# Patient Record
Sex: FEMALE | Race: WHITE | Employment: OTHER | URBAN - METROPOLITAN AREA
[De-identification: names, ages, dates, MRNs, and addresses within clinical notes are randomized per-mention and may not be internally consistent; named-entity substitution may affect disease eponyms.]

---

## 2017-05-09 ENCOUNTER — TELEPHONE (OUTPATIENT)
Dept: FAMILY MEDICINE CLINIC | Facility: CLINIC | Age: 62
End: 2017-05-09

## 2017-05-09 DIAGNOSIS — R53.82 CHRONIC FATIGUE: Primary | ICD-10-CM

## 2017-05-09 DIAGNOSIS — G47.429 NARCOLEPSY DUE TO UNDERLYING CONDITION WITHOUT CATAPLEXY: ICD-10-CM

## 2017-05-09 NOTE — TELEPHONE ENCOUNTER
Please sign off sleep study  Pt will check ins and call [facility]. I made her a June 9th apt.

## 2017-05-09 NOTE — TELEPHONE ENCOUNTER
Pt saw  her endocrinologist ,Dr Amada Villatoro recently. Pt  cmp,and thyroid labs are good. Dr Amada Villatoro is referring her back to you for  persistant fatigue,,waking up tired  and falling asleep doing the day. She feels she needs a sleep study . She requested a apt with

## 2017-06-09 ENCOUNTER — OFFICE VISIT (OUTPATIENT)
Dept: FAMILY MEDICINE CLINIC | Facility: CLINIC | Age: 62
End: 2017-06-09

## 2017-06-09 VITALS
WEIGHT: 170 LBS | TEMPERATURE: 99 F | RESPIRATION RATE: 16 BRPM | HEART RATE: 65 BPM | HEIGHT: 63.5 IN | BODY MASS INDEX: 29.75 KG/M2 | SYSTOLIC BLOOD PRESSURE: 128 MMHG | DIASTOLIC BLOOD PRESSURE: 76 MMHG

## 2017-06-09 DIAGNOSIS — Z71.89 COUNSELING AND COORDINATION OF CARE: ICD-10-CM

## 2017-06-09 DIAGNOSIS — G47.10 HYPERSOMNOLENCE: Primary | ICD-10-CM

## 2017-06-09 PROCEDURE — 99214 OFFICE O/P EST MOD 30 MIN: CPT | Performed by: FAMILY MEDICINE

## 2017-06-09 RX ORDER — CETIRIZINE HYDROCHLORIDE 10 MG/1
10 TABLET ORAL DAILY
COMMUNITY
End: 2019-01-28 | Stop reason: ALTCHOICE

## 2017-06-09 RX ORDER — MULTIVIT-MIN/IRON/FOLIC ACID/K 18-600-40
1 CAPSULE ORAL DAILY
COMMUNITY
End: 2019-01-28 | Stop reason: ALTCHOICE

## 2017-06-09 RX ORDER — FLUTICASONE PROPIONATE 50 MCG
1 SPRAY, SUSPENSION (ML) NASAL
COMMUNITY

## 2017-06-09 NOTE — PROGRESS NOTES
Artur Blanca is a 63year old female. HPI:   Pt. States that she is more active and lost weight and is tired all the time. She does not have stamina. Often has a slight headache when wakes up. Activity will cure it and tylenol always helps. Disp:  Rfl:    Fexofenadine HCl (ALLEGRA) 180 MG Oral Tab Take 180 mg by mouth daily.  Disp:  Rfl:       No Known Allergies   Past Medical History   Diagnosis Date   • H/O mammogram 3/10/2011   • Sinusitis    • Routine gynecological examination 11/24/2010 slides, five Pap stained   alcohol-fixed slides, and a separate Pap stained alcohol-fixed cytospin of   the needle rinse.     B- Labeled with the patient's name, medical record number, fine needle   aspiration of left mid thyroid nodule, consisting of five patient is asked to return in 2-3 months.

## 2017-06-20 ENCOUNTER — OFFICE VISIT (OUTPATIENT)
Dept: SLEEP CENTER | Facility: HOSPITAL | Age: 62
End: 2017-06-20
Attending: FAMILY MEDICINE
Payer: COMMERCIAL

## 2017-06-20 PROCEDURE — 95810 POLYSOM 6/> YRS 4/> PARAM: CPT

## 2017-06-22 NOTE — PROCEDURES
[address] [address]       Accredited by the [facility] ([facility])    PATIENT'S NAME:        Christo Mckeon  ATTENDING PHYSICIAN:   Cuong Donnelly M.D.   REFERRING PHYSICIAN:   LAKESHA Gomez a.m.  Total sleep time was 386 minutes, sleep efficiency was 84.8%, sleep latency was 13.5 minutes, wake after sleep onset was 50 minutes. During sleep, all stages of sleep were seen. Slow wave sleep comprised 30.3% of total sleep time.   REM sleep occupi supine position. 5.   Weight loss would likely have an ameliorating effect on the degree of sleep-disordered breathing identified.   6.   If daytime sleepiness is a complaint, the patient needs to understand the potential dangers associated with reduced da

## 2017-08-23 ENCOUNTER — HOSPITAL ENCOUNTER (OUTPATIENT)
Dept: ULTRASOUND IMAGING | Facility: HOSPITAL | Age: 62
Discharge: HOME | End: 2017-08-23
Attending: OTOLARYNGOLOGY
Payer: COMMERCIAL

## 2017-08-23 DIAGNOSIS — E07.9 THYROID DYSFUNCTION: ICD-10-CM

## 2017-08-23 PROCEDURE — 76536 US EXAM OF HEAD AND NECK: CPT | Performed by: OTOLARYNGOLOGY

## 2017-09-25 ENCOUNTER — OFFICE VISIT (OUTPATIENT)
Dept: UROLOGY | Facility: HOSPITAL | Age: 62
End: 2017-09-25
Attending: OBSTETRICS & GYNECOLOGY
Payer: COMMERCIAL

## 2017-09-25 VITALS
HEIGHT: 63.5 IN | SYSTOLIC BLOOD PRESSURE: 112 MMHG | DIASTOLIC BLOOD PRESSURE: 70 MMHG | WEIGHT: 170 LBS | BODY MASS INDEX: 29.75 KG/M2

## 2017-09-25 DIAGNOSIS — N81.84 PELVIC MUSCLE WASTING: Primary | ICD-10-CM

## 2017-09-25 DIAGNOSIS — N95.2 POSTMENOPAUSAL ATROPHIC VAGINITIS: ICD-10-CM

## 2017-09-25 DIAGNOSIS — N81.6 RECTOCELE: ICD-10-CM

## 2017-09-25 DIAGNOSIS — N39.3 FEMALE STRESS INCONTINENCE: ICD-10-CM

## 2017-09-25 LAB
BLOOD URINE: NEGATIVE
CONTROL RUN WITHIN 24 HOURS?: YES
LEUKOCYTE ESTERASE URINE: NEGATIVE
NITRITE URINE: NEGATIVE

## 2017-09-25 PROCEDURE — 81002 URINALYSIS NONAUTO W/O SCOPE: CPT

## 2017-09-25 PROCEDURE — 87086 URINE CULTURE/COLONY COUNT: CPT | Performed by: OBSTETRICS & GYNECOLOGY

## 2017-09-25 PROCEDURE — 99201 HC OUTPT EVAL AND MGNT NEW PT LEVEL 1: CPT

## 2017-09-25 RX ORDER — POLYETHYLENE GLYCOL 3350 17 G/17G
17 POWDER, FOR SOLUTION ORAL AS NEEDED
COMMUNITY
End: 2021-02-02 | Stop reason: ALTCHOICE

## 2017-09-25 RX ORDER — ESTRADIOL 0.1 MG/G
CREAM VAGINAL
Qty: 1 TUBE | Refills: 3 | Status: SHIPPED | OUTPATIENT
Start: 2017-09-25 | End: 2019-01-28

## 2017-09-25 NOTE — PATIENT INSTRUCTIONS
[address] [address] PELVIC MEDICINE    BOWEL REGIMEN    Constipation can have detrimental effects on bladder function and can worsen the symptoms of prolapse. It is important to avoid constipation.     The first step for treating constipation is to i your vagina. You want to draw the muscles quickly and deliberately together as though you were trying to stop urination or gas from passing from the rectum. Once you have pulled the muscles together, hold the contraction for at least 5 seconds.   Then rel week you are doing 5 sets of contractions each day with 20 contractions each time. That’s 100 contractions each day! Eventually you should be able to hold each contraction for a full 10 seconds.   Once you feel comfortable with the exercises, you should

## 2017-09-25 NOTE — PROGRESS NOTES
Ambar Howell,   9/25/2017     Referred by Dr. Vinay Rosas  Patient presents with:  Prolapse: noticed for past 5 years  Incontinence: MEGGAN  for 2 years - occassional      HPI:  +MEGGAN, occasionally  Denies UUI  + prolapse (bulge worse prior to hyst).  Progress Cholecalciferol (VITAMIN D) 2000 units Oral Cap Take 1 capsule by mouth daily. Disp:  Rfl:    LEVOTHYROXINE SODIUM 50 MCG Oral Tab TAKE 1 TABLET DAILY Disp: 90 tablet Rfl: 0   Multiple Vitamins-Minerals (MULTIVITAMIN OR) Take by mouth.  Disp:  Rfl:    Vit DIPSTICK      Discussion Items:   Urodynamics and cystoscopy for evaluation of LUTS  Behavioral and pharmacologic treatments for OAB  Nonsurgical and surgical treatments for Stress Urinary Incontinence  Nonsurgical and surgical treatments for POP  Pelvic m

## 2017-09-29 ENCOUNTER — TELEPHONE (OUTPATIENT)
Dept: UROLOGY | Facility: HOSPITAL | Age: 62
End: 2017-09-29

## 2017-10-20 ENCOUNTER — TELEPHONE (OUTPATIENT)
Dept: FAMILY MEDICINE CLINIC | Facility: CLINIC | Age: 62
End: 2017-10-20

## 2017-10-20 DIAGNOSIS — Z12.31 ENCOUNTER FOR SCREENING MAMMOGRAM FOR MALIGNANT NEOPLASM OF BREAST: Primary | ICD-10-CM

## 2017-10-20 DIAGNOSIS — Z00.00 LABORATORY EXAMINATION ORDERED AS PART OF A ROUTINE GENERAL MEDICAL EXAMINATION: ICD-10-CM

## 2017-10-20 NOTE — TELEPHONE ENCOUNTER
Pt relayed that she has a physical schedule with Dr. Lenn Buerger in Jan 2018 and has an appt to see her endocrinologist, Dr. Candi Gonzalez, this November.  Pt is aware Dr. Lenn Buerger usually orders labs for her at her physical and she is asking if MD can place order and sh

## 2017-11-27 ENCOUNTER — HOSPITAL ENCOUNTER (OUTPATIENT)
Dept: MAMMOGRAPHY | Age: 62
Discharge: HOME | End: 2017-11-27
Attending: FAMILY MEDICINE
Payer: COMMERCIAL

## 2017-11-27 DIAGNOSIS — Z12.31 ENCOUNTER FOR SCREENING MAMMOGRAM FOR MALIGNANT NEOPLASM OF BREAST: ICD-10-CM

## 2017-11-27 PROCEDURE — 77063 BREAST TOMOSYNTHESIS BI: CPT | Performed by: FAMILY MEDICINE

## 2017-11-27 PROCEDURE — 77067 SCR MAMMO BI INCL CAD: CPT | Performed by: FAMILY MEDICINE

## 2017-12-05 ENCOUNTER — TELEPHONE (OUTPATIENT)
Dept: UROLOGY | Facility: HOSPITAL | Age: 62
End: 2017-12-05

## 2017-12-05 NOTE — TELEPHONE ENCOUNTER
Pt called asking questions about estrace cream. Wondering if her  should use a condom when she usees her estrace cream. Explained to pt this was not necessary. Pt also aking about bowel regimen.  Pt is using benefiber daily and miralax PRN, but find

## 2018-01-25 PROBLEM — D35.1 PARATHYROID ADENOMA: Status: ACTIVE | Noted: 2018-01-25

## 2018-01-26 ENCOUNTER — OFFICE VISIT (OUTPATIENT)
Dept: FAMILY MEDICINE CLINIC | Facility: CLINIC | Age: 63
End: 2018-01-26

## 2018-01-26 VITALS
RESPIRATION RATE: 16 BRPM | OXYGEN SATURATION: 98 % | DIASTOLIC BLOOD PRESSURE: 80 MMHG | HEIGHT: 63 IN | SYSTOLIC BLOOD PRESSURE: 122 MMHG | TEMPERATURE: 97 F | BODY MASS INDEX: 32.96 KG/M2 | WEIGHT: 186 LBS | HEART RATE: 61 BPM

## 2018-01-26 DIAGNOSIS — Z12.11 SCREENING FOR COLON CANCER: ICD-10-CM

## 2018-01-26 DIAGNOSIS — Z00.00 ROUTINE GENERAL MEDICAL EXAMINATION AT HEALTH CARE FACILITY: ICD-10-CM

## 2018-01-26 DIAGNOSIS — Z00.00 ROUTINE GENERAL MEDICAL EXAMINATION AT A HEALTH CARE FACILITY: Primary | ICD-10-CM

## 2018-01-26 DIAGNOSIS — E66.09 CLASS 1 OBESITY DUE TO EXCESS CALORIES WITHOUT SERIOUS COMORBIDITY WITH BODY MASS INDEX (BMI) OF 32.0 TO 32.9 IN ADULT: ICD-10-CM

## 2018-01-26 PROBLEM — G47.30 MILD SLEEP APNEA: Status: ACTIVE | Noted: 2018-01-26

## 2018-01-26 PROCEDURE — 99396 PREV VISIT EST AGE 40-64: CPT | Performed by: FAMILY MEDICINE

## 2018-01-26 RX ORDER — WHEAT DEXTRIN 3 G/3.8 G
POWDER (GRAM) ORAL AS NEEDED
COMMUNITY
End: 2021-02-02

## 2018-01-26 NOTE — H&P
CC: Annual Physical Exam    HPI:   Isis Rubio is a 63year old female who presents for a complete physical exam. Symptoms: is S/P TENA, ovaries preserved. Patient complains of nothing.   Saw sleep specialist.  Weaned off unisom and using sleep hy (BENEFIBER) Oral Powder Take by mouth. Disp:  Rfl:    PEG 3350 Oral Powd Pack Take 17 g by mouth as needed. Disp:  Rfl:    Estradiol (ESTRACE) 0.1 MG/GM Vaginal Cream Apply 1/2 gram vaginally 2 times per week.  Disp: 1 Tube Rfl: 3   cetirizine 10 MG Oral Ta stroke syndrome [OTHER] Maternal Grandmother      brain aneurysm      Social History:   Smoking status: Never Smoker                                                              Smokeless tobacco: Never Used                      Alcohol use:  No bilaterally    ASSESSMENT AND PLAN:   Andrea Ortega is a 63year old female who presents for a complete physical exam.   Pap and pelvic NOT done. Mammo and dexa are UTD.   To Dr. Mary Ann Little for rectal prolapse -- using estradiol pea size 2 times a

## 2018-06-12 ENCOUNTER — OFFICE VISIT (OUTPATIENT)
Dept: FAMILY MEDICINE CLINIC | Facility: CLINIC | Age: 63
End: 2018-06-12

## 2018-06-12 VITALS
DIASTOLIC BLOOD PRESSURE: 80 MMHG | RESPIRATION RATE: 12 BRPM | WEIGHT: 190 LBS | HEART RATE: 72 BPM | SYSTOLIC BLOOD PRESSURE: 122 MMHG | HEIGHT: 63 IN | BODY MASS INDEX: 33.66 KG/M2

## 2018-06-12 DIAGNOSIS — Z91.09 ENVIRONMENTAL ALLERGIES: ICD-10-CM

## 2018-06-12 DIAGNOSIS — G47.33 OSA (OBSTRUCTIVE SLEEP APNEA): ICD-10-CM

## 2018-06-12 DIAGNOSIS — E66.09 CLASS 1 OBESITY DUE TO EXCESS CALORIES WITHOUT SERIOUS COMORBIDITY WITH BODY MASS INDEX (BMI) OF 33.0 TO 33.9 IN ADULT: Primary | ICD-10-CM

## 2018-06-12 PROCEDURE — 99213 OFFICE O/P EST LOW 20 MIN: CPT | Performed by: FAMILY MEDICINE

## 2018-06-12 RX ORDER — PHENTERMINE HYDROCHLORIDE 37.5 MG/1
37.5 TABLET ORAL
Qty: 30 TABLET | Refills: 2 | Status: SHIPPED | OUTPATIENT
Start: 2018-06-12 | End: 2019-01-28

## 2018-06-12 NOTE — PROGRESS NOTES
Zack St is a 63year old female. HPI:   In the beginning of May she started weight watchers. She lost 6 lbs and then went on vacation and gained weight. She is sleeping well. She has mild supine LANDY. So she sleeps on her side.   She is f or performed in visit on 10/20/17  -CBC WITH DIFFERENTIAL WITH PLATELET   Result Value Ref Range   WHITE BLOOD CELL COUNT 4.5 3.8 - 10.8 Thousand/uL   RED BLOOD CELL COUNT 4.71 3.80 - 5.10 Million/uL   HEMOGLOBIN 14.2 11.7 - 15.5 g/dL   HEMATOCRIT 41.7 35. serious comorbidity with body mass index (bmi) of 33.0 to 33.9 in adult  (primary encounter diagnosis)  Environmental allergies  Ortiz (obstructive sleep apnea)    No orders of the defined types were placed in this encounter.       Meds & Refills for this [facility]

## 2018-06-14 DIAGNOSIS — E78.1 PURE HYPERGLYCERIDEMIA: ICD-10-CM

## 2018-06-14 DIAGNOSIS — E78.5 DYSLIPIDEMIA: ICD-10-CM

## 2018-06-14 DIAGNOSIS — E66.09 CLASS 1 OBESITY DUE TO EXCESS CALORIES WITHOUT SERIOUS COMORBIDITY WITH BODY MASS INDEX (BMI) OF 32.0 TO 32.9 IN ADULT: Primary | ICD-10-CM

## 2018-06-14 DIAGNOSIS — E03.9 ACQUIRED HYPOTHYROIDISM: ICD-10-CM

## 2018-06-14 NOTE — PROGRESS NOTES
Patient is to confirm with  when colonoscopy needs to be repeated. They told her 10 years, our last consult says repeat in 5 years which would be this year.  Also she would like to get referral to weight loss clinic, pended to

## 2018-08-01 ENCOUNTER — HOSPITAL ENCOUNTER (OUTPATIENT)
Dept: ULTRASOUND IMAGING | Facility: HOSPITAL | Age: 63
Discharge: HOME | End: 2018-08-01
Attending: OTOLARYNGOLOGY
Payer: COMMERCIAL

## 2018-08-01 DIAGNOSIS — E04.1 THYROID NODULE: ICD-10-CM

## 2018-08-01 PROCEDURE — 76536 US EXAM OF HEAD AND NECK: CPT | Performed by: OTOLARYNGOLOGY

## 2018-08-03 NOTE — PROGRESS NOTES
Please inform us thyroid showing right surgical removal noted residual tissue present in right thyroid bed, left one nodule increased in size now 1.3cm rest are stable recommend FNA to left nodule 1.3cm

## 2018-08-14 ENCOUNTER — HOSPITAL ENCOUNTER (OUTPATIENT)
Dept: ULTRASOUND IMAGING | Facility: HOSPITAL | Age: 63
Discharge: HOME | End: 2018-08-14
Attending: OTOLARYNGOLOGY
Payer: COMMERCIAL

## 2018-08-14 DIAGNOSIS — E04.1 THYROID NODULE: ICD-10-CM

## 2018-08-14 PROCEDURE — 76942 ECHO GUIDE FOR BIOPSY: CPT | Performed by: OTOLARYNGOLOGY

## 2018-08-14 PROCEDURE — 88173 CYTOPATH EVAL FNA REPORT: CPT | Performed by: OTOLARYNGOLOGY

## 2018-08-14 PROCEDURE — 10022 US FNA THYROID (CPT=10022/76942): CPT | Performed by: OTOLARYNGOLOGY

## 2018-08-17 NOTE — PROGRESS NOTES
Please inform FNA appears nondiagnostic not enough cells to diagnose keep follow up to further discuss

## 2018-08-17 NOTE — PROGRESS NOTES
Per phone consent [phone] Cell. Notified of results and recommendations and verbalized understanding.

## 2018-10-26 ENCOUNTER — IMMUNIZATION (OUTPATIENT)
Dept: FAMILY MEDICINE CLINIC | Facility: CLINIC | Age: 63
End: 2018-10-26
Payer: COMMERCIAL

## 2018-10-26 ENCOUNTER — TELEPHONE (OUTPATIENT)
Dept: FAMILY MEDICINE CLINIC | Facility: CLINIC | Age: 63
End: 2018-10-26

## 2018-10-26 DIAGNOSIS — Z23 NEED FOR VACCINATION: ICD-10-CM

## 2018-10-26 DIAGNOSIS — Z12.39 SCREENING FOR BREAST CANCER: Primary | ICD-10-CM

## 2018-10-26 PROCEDURE — 90686 IIV4 VACC NO PRSV 0.5 ML IM: CPT | Performed by: FAMILY MEDICINE

## 2018-10-26 PROCEDURE — 90471 IMMUNIZATION ADMIN: CPT | Performed by: FAMILY MEDICINE

## 2018-10-26 NOTE — TELEPHONE ENCOUNTER
Patient made appointment for physical for 1/28/19 and would like an order for her yearly mammogram to get done before that. Thank you.

## 2018-11-28 ENCOUNTER — HOSPITAL ENCOUNTER (OUTPATIENT)
Dept: MAMMOGRAPHY | Age: 63
Discharge: HOME | End: 2018-11-28
Attending: FAMILY MEDICINE
Payer: COMMERCIAL

## 2018-11-28 DIAGNOSIS — Z12.39 SCREENING FOR BREAST CANCER: ICD-10-CM

## 2018-11-28 PROCEDURE — 77063 BREAST TOMOSYNTHESIS BI: CPT | Performed by: FAMILY MEDICINE

## 2018-11-28 PROCEDURE — 77067 SCR MAMMO BI INCL CAD: CPT | Performed by: FAMILY MEDICINE

## 2019-01-09 ENCOUNTER — HOSPITAL ENCOUNTER (OUTPATIENT)
Dept: ULTRASOUND IMAGING | Facility: HOSPITAL | Age: 64
Discharge: HOME | End: 2019-01-09
Attending: INTERNAL MEDICINE
Payer: COMMERCIAL

## 2019-01-09 DIAGNOSIS — E03.9 HYPOTHYROIDISM, UNSPECIFIED TYPE: ICD-10-CM

## 2019-01-09 PROCEDURE — 76536 US EXAM OF HEAD AND NECK: CPT | Performed by: INTERNAL MEDICINE

## 2019-01-19 ENCOUNTER — PATIENT OUTREACH (OUTPATIENT)
Dept: FAMILY MEDICINE CLINIC | Facility: CLINIC | Age: 64
End: 2019-01-19

## 2019-01-19 DIAGNOSIS — Z12.11 COLON CANCER SCREENING: Primary | ICD-10-CM

## 2019-01-19 NOTE — PROGRESS NOTES
Patient's last colonoscopy 07/12/2013, per Dr. Perla Engel recommendations patient due for repeat colonoscopy in 5 years (2018)- please remind patient of this and to follow up with Dr. Natasha Ryan for colonoscopy.  I have placed a new referral.

## 2019-01-23 NOTE — PROGRESS NOTES
Spoke with pt regarding colonoscopy follow up. Pt stated she wanted a referral for a new doctor.  Pt stated she comes in next week for an appointment and will discuss this with

## 2019-01-28 ENCOUNTER — OFFICE VISIT (OUTPATIENT)
Dept: FAMILY MEDICINE CLINIC | Facility: CLINIC | Age: 64
End: 2019-01-28
Payer: COMMERCIAL

## 2019-01-28 VITALS
RESPIRATION RATE: 18 BRPM | SYSTOLIC BLOOD PRESSURE: 122 MMHG | TEMPERATURE: 98 F | BODY MASS INDEX: 34.34 KG/M2 | HEART RATE: 64 BPM | DIASTOLIC BLOOD PRESSURE: 82 MMHG | HEIGHT: 62.25 IN | WEIGHT: 189 LBS

## 2019-01-28 DIAGNOSIS — Z13.89 SCREENING FOR GENITOURINARY CONDITION: ICD-10-CM

## 2019-01-28 DIAGNOSIS — Z01.419 ENCOUNTER FOR ROUTINE GYNECOLOGICAL EXAMINATION WITH PAPANICOLAOU SMEAR OF CERVIX: ICD-10-CM

## 2019-01-28 DIAGNOSIS — Z00.00 ROUTINE GENERAL MEDICAL EXAMINATION AT A HEALTH CARE FACILITY: Primary | ICD-10-CM

## 2019-01-28 DIAGNOSIS — H53.9 VISION CHANGES: ICD-10-CM

## 2019-01-28 DIAGNOSIS — I10 ESSENTIAL HYPERTENSION: ICD-10-CM

## 2019-01-28 DIAGNOSIS — I99.9 VASCULAR ABNORMALITY: ICD-10-CM

## 2019-01-28 DIAGNOSIS — Z00.00 LABORATORY EXAMINATION ORDERED AS PART OF A ROUTINE GENERAL MEDICAL EXAMINATION: ICD-10-CM

## 2019-01-28 DIAGNOSIS — E03.9 ACQUIRED HYPOTHYROIDISM: ICD-10-CM

## 2019-01-28 DIAGNOSIS — Z78.0 MENOPAUSE: ICD-10-CM

## 2019-01-28 DIAGNOSIS — E78.1 PURE HYPERGLYCERIDEMIA: ICD-10-CM

## 2019-01-28 DIAGNOSIS — D32.0 BENIGN NEOPLASM OF CEREBRAL MENINGES (HCC): ICD-10-CM

## 2019-01-28 DIAGNOSIS — Z82.49 FAMILY HISTORY OF ANEURYSM: ICD-10-CM

## 2019-01-28 DIAGNOSIS — Z13.820 SCREENING FOR OSTEOPOROSIS: ICD-10-CM

## 2019-01-28 PROCEDURE — 88175 CYTOPATH C/V AUTO FLUID REDO: CPT | Performed by: FAMILY MEDICINE

## 2019-01-28 PROCEDURE — 99396 PREV VISIT EST AGE 40-64: CPT | Performed by: FAMILY MEDICINE

## 2019-01-28 NOTE — H&P
CC: Annual Physical Exam    HPI:   Armin Villavicencio is a 66year old female who presents for a complete physical exam. Symptoms: is S/P TENA, ovaries preserved. Patient complains of nothing. Weaned off unisom and using sleep hygeine to sleep.   Saw mid thyroid is processed and examined but insufficient for diagnosis due to scant follicular cells and /or colloid. Clinical correlation is suggested. If clinically indicated, close clinical followup or repeat aspiration may be useful.       Gross Descrip ASPIRATION RIGHT Right 2007   • D & C  11/28/2011   • DUAL ENERGY X-RAY ABSORPTIOMETRY, BODY COMPOSITION STUDY, 1+ SITES  11/12/2009   • HYSTERECTOMY  2/2013    no BSO, TVH   • OTHER SURGICAL HISTORY  1/12/2005    R parathyroid \"ectomy 1 lower R\"   • THY distress  SKIN: no rashes,no suspicious lesions  HEENT: atraumatic, normocephalic,ears and throat are clear  EYES:PERRLA, EOMI, conjunctiva are clear  NECK: supple,no adenopathy,no bruits, s/p thyroidectomy and nodules   CHEST: no chest tenderness  BREAST:

## 2019-10-31 ENCOUNTER — IMMUNIZATION (OUTPATIENT)
Dept: FAMILY MEDICINE CLINIC | Facility: CLINIC | Age: 64
End: 2019-10-31
Payer: COMMERCIAL

## 2019-10-31 DIAGNOSIS — Z23 NEED FOR VACCINATION: ICD-10-CM

## 2019-10-31 PROCEDURE — 90471 IMMUNIZATION ADMIN: CPT | Performed by: FAMILY MEDICINE

## 2019-10-31 PROCEDURE — 90686 IIV4 VACC NO PRSV 0.5 ML IM: CPT | Performed by: FAMILY MEDICINE

## 2019-12-02 ENCOUNTER — TELEPHONE (OUTPATIENT)
Dept: FAMILY MEDICINE CLINIC | Facility: CLINIC | Age: 64
End: 2019-12-02

## 2019-12-02 DIAGNOSIS — Z12.39 SCREENING FOR BREAST CANCER: Primary | ICD-10-CM

## 2019-12-02 NOTE — TELEPHONE ENCOUNTER
Pt had her physical in January and called to try to schedule her mammogram but saw that there was no order placed. Please place if allowed.

## 2019-12-02 NOTE — TELEPHONE ENCOUNTER
Lm mammogram was ordered. Please call back and make an appt for a physical exam in January with Dr. RAMOS [facility].

## 2019-12-20 ENCOUNTER — HOSPITAL ENCOUNTER (OUTPATIENT)
Dept: MAMMOGRAPHY | Age: 64
Discharge: HOME | End: 2019-12-20
Attending: NURSE PRACTITIONER
Payer: COMMERCIAL

## 2019-12-20 DIAGNOSIS — Z12.39 SCREENING FOR BREAST CANCER: ICD-10-CM

## 2019-12-20 PROCEDURE — 77067 SCR MAMMO BI INCL CAD: CPT | Performed by: NURSE PRACTITIONER

## 2019-12-20 PROCEDURE — 77063 BREAST TOMOSYNTHESIS BI: CPT | Performed by: NURSE PRACTITIONER

## 2020-01-27 ENCOUNTER — HOSPITAL ENCOUNTER (OUTPATIENT)
Dept: BONE DENSITY | Age: 65
Discharge: HOME | End: 2020-01-27
Attending: INTERNAL MEDICINE
Payer: COMMERCIAL

## 2020-01-27 DIAGNOSIS — E21.0 PRIMARY HYPERPARATHYROIDISM (HCC): ICD-10-CM

## 2020-01-27 DIAGNOSIS — N95.9 UNSPECIFIED MENOPAUSAL AND PERIMENOPAUSAL DISORDER: ICD-10-CM

## 2020-01-27 PROCEDURE — 77080 DXA BONE DENSITY AXIAL: CPT | Performed by: INTERNAL MEDICINE

## 2020-01-29 ENCOUNTER — HOSPITAL ENCOUNTER (OUTPATIENT)
Dept: ULTRASOUND IMAGING | Facility: HOSPITAL | Age: 65
Discharge: HOME | End: 2020-01-29
Attending: INTERNAL MEDICINE
Payer: COMMERCIAL

## 2020-01-29 DIAGNOSIS — E03.9 HYPOTHYROIDISM: ICD-10-CM

## 2020-01-29 PROCEDURE — 76536 US EXAM OF HEAD AND NECK: CPT | Performed by: INTERNAL MEDICINE

## 2020-02-14 ENCOUNTER — HOSPITAL ENCOUNTER (OUTPATIENT)
Dept: ULTRASOUND IMAGING | Facility: HOSPITAL | Age: 65
Discharge: HOME | End: 2020-02-14
Attending: OTOLARYNGOLOGY
Payer: COMMERCIAL

## 2020-02-14 DIAGNOSIS — E04.1 THYROID NODULE: ICD-10-CM

## 2020-02-14 PROCEDURE — 88173 CYTOPATH EVAL FNA REPORT: CPT | Performed by: OTOLARYNGOLOGY

## 2020-02-14 PROCEDURE — 10005 FNA BX W/US GDN 1ST LES: CPT | Performed by: OTOLARYNGOLOGY

## 2020-02-14 NOTE — PROCEDURES
[facility]  Procedure Note    Artur Blanca Patient Status:  Outpatient    10/1/1955 MRN [id number]   Location [address] [place] [address] Attending Ernestine Knott MD   Hosp Day # 0 PCP Giles Madrid DO     Procedure: US guided thyroid bi

## 2020-02-19 NOTE — PROGRESS NOTES
Informed pt of results and recommendations per KO. Pt stated she was told yesterday repeat in 2 years. Per LOV 2/18/20: Us in 2 years if bx neg    Future order placed.

## 2020-08-30 ENCOUNTER — OFFICE VISIT (OUTPATIENT)
Dept: FAMILY MEDICINE CLINIC | Facility: CLINIC | Age: 65
End: 2020-08-30
Payer: COMMERCIAL

## 2020-08-30 VITALS
HEIGHT: 63 IN | HEART RATE: 73 BPM | DIASTOLIC BLOOD PRESSURE: 82 MMHG | RESPIRATION RATE: 14 BRPM | OXYGEN SATURATION: 99 % | SYSTOLIC BLOOD PRESSURE: 112 MMHG | TEMPERATURE: 98 F | BODY MASS INDEX: 34.55 KG/M2 | WEIGHT: 195 LBS

## 2020-08-30 DIAGNOSIS — R39.9 UTI SYMPTOMS: Primary | ICD-10-CM

## 2020-08-30 LAB
APPEARANCE: CLEAR
MULTISTIX LOT#: ABNORMAL NUMERIC
PH, URINE: 5.5 (ref 4.5–8)
SPECIFIC GRAVITY: 1.01 (ref 1–1.03)
URINE-COLOR: YELLOW
UROBILINOGEN,SEMI-QN: 0.2 MG/DL (ref 0–1.9)

## 2020-08-30 PROCEDURE — 3074F SYST BP LT 130 MM HG: CPT | Performed by: NURSE PRACTITIONER

## 2020-08-30 PROCEDURE — 99213 OFFICE O/P EST LOW 20 MIN: CPT | Performed by: NURSE PRACTITIONER

## 2020-08-30 PROCEDURE — 3008F BODY MASS INDEX DOCD: CPT | Performed by: NURSE PRACTITIONER

## 2020-08-30 PROCEDURE — 87086 URINE CULTURE/COLONY COUNT: CPT | Performed by: NURSE PRACTITIONER

## 2020-08-30 PROCEDURE — 81003 URINALYSIS AUTO W/O SCOPE: CPT | Performed by: NURSE PRACTITIONER

## 2020-08-30 PROCEDURE — 3079F DIAST BP 80-89 MM HG: CPT | Performed by: NURSE PRACTITIONER

## 2020-08-30 RX ORDER — NITROFURANTOIN 25; 75 MG/1; MG/1
100 CAPSULE ORAL 2 TIMES DAILY
Qty: 14 CAPSULE | Refills: 0 | Status: SHIPPED | OUTPATIENT
Start: 2020-08-30 | End: 2020-09-06

## 2020-08-30 NOTE — PROGRESS NOTES
CHIEF COMPLAINT:   Patient presents with:  UTI      HPI:   Ac Quispe is a 60year old female who presents with symptoms of UTI. Complaining of urinary frequency, urgency, dysuria for 1 days. Symptoms have been persistent since onset.   Treat headaches.     EXAM:   /82   Pulse 73   Temp 98 °F (36.7 °C)   Resp 14   Ht 63\"   Wt 195 lb (88.5 kg)   SpO2 99%   BMI 34.54 kg/m²   GENERAL: well developed, well nourished,in no apparent distress  CARDIO: RRR, no murmurs  LUNGS: clear to ausculation file for this visit.

## 2020-09-24 ENCOUNTER — TELEPHONE (OUTPATIENT)
Dept: FAMILY MEDICINE CLINIC | Facility: CLINIC | Age: 65
End: 2020-09-24

## 2021-01-21 ENCOUNTER — MED REC SCAN ONLY (OUTPATIENT)
Dept: FAMILY MEDICINE CLINIC | Facility: CLINIC | Age: 66
End: 2021-01-21

## 2021-02-02 ENCOUNTER — OFFICE VISIT (OUTPATIENT)
Dept: FAMILY MEDICINE CLINIC | Facility: CLINIC | Age: 66
End: 2021-02-02
Payer: COMMERCIAL

## 2021-02-02 VITALS
RESPIRATION RATE: 18 BRPM | BODY MASS INDEX: 33.31 KG/M2 | HEIGHT: 63 IN | HEART RATE: 72 BPM | WEIGHT: 188 LBS | SYSTOLIC BLOOD PRESSURE: 128 MMHG | DIASTOLIC BLOOD PRESSURE: 80 MMHG

## 2021-02-02 DIAGNOSIS — Z13.89 SCREENING FOR GENITOURINARY CONDITION: ICD-10-CM

## 2021-02-02 DIAGNOSIS — E04.1 THYROID NODULE: ICD-10-CM

## 2021-02-02 DIAGNOSIS — Z00.00 ROUTINE GENERAL MEDICAL EXAMINATION AT A HEALTH CARE FACILITY: ICD-10-CM

## 2021-02-02 DIAGNOSIS — Z00.00 LABORATORY EXAMINATION ORDERED AS PART OF A ROUTINE GENERAL MEDICAL EXAMINATION: ICD-10-CM

## 2021-02-02 DIAGNOSIS — Z12.31 ENCOUNTER FOR SCREENING MAMMOGRAM FOR MALIGNANT NEOPLASM OF BREAST: ICD-10-CM

## 2021-02-02 DIAGNOSIS — Z71.85 VACCINE COUNSELING: ICD-10-CM

## 2021-02-02 DIAGNOSIS — E55.9 VITAMIN D DEFICIENCY: ICD-10-CM

## 2021-02-02 DIAGNOSIS — E66.9 OBESITY (BMI 30.0-34.9): ICD-10-CM

## 2021-02-02 DIAGNOSIS — E21.3 HYPERPARATHYROIDISM, UNSPECIFIED (HCC): ICD-10-CM

## 2021-02-02 DIAGNOSIS — G47.33 OSA (OBSTRUCTIVE SLEEP APNEA): ICD-10-CM

## 2021-02-02 DIAGNOSIS — E03.9 ACQUIRED HYPOTHYROIDISM: ICD-10-CM

## 2021-02-02 DIAGNOSIS — E78.5 DYSLIPIDEMIA: ICD-10-CM

## 2021-02-02 DIAGNOSIS — Z79.899 MEDICATION MANAGEMENT: ICD-10-CM

## 2021-02-02 DIAGNOSIS — I10 ESSENTIAL HYPERTENSION: Primary | ICD-10-CM

## 2021-02-02 DIAGNOSIS — K21.00 GASTROESOPHAGEAL REFLUX DISEASE WITH ESOPHAGITIS WITHOUT HEMORRHAGE: ICD-10-CM

## 2021-02-02 PROCEDURE — 90750 HZV VACC RECOMBINANT IM: CPT | Performed by: FAMILY MEDICINE

## 2021-02-02 PROCEDURE — 3079F DIAST BP 80-89 MM HG: CPT | Performed by: FAMILY MEDICINE

## 2021-02-02 PROCEDURE — 90471 IMMUNIZATION ADMIN: CPT | Performed by: FAMILY MEDICINE

## 2021-02-02 PROCEDURE — 99396 PREV VISIT EST AGE 40-64: CPT | Performed by: FAMILY MEDICINE

## 2021-02-02 PROCEDURE — 3008F BODY MASS INDEX DOCD: CPT | Performed by: FAMILY MEDICINE

## 2021-02-02 PROCEDURE — 3074F SYST BP LT 130 MM HG: CPT | Performed by: FAMILY MEDICINE

## 2021-02-02 RX ORDER — ERGOCALCIFEROL 1.25 MG/1
50000 CAPSULE ORAL WEEKLY
COMMUNITY
Start: 2020-12-30

## 2021-02-02 RX ORDER — BUPROPION HYDROCHLORIDE 300 MG/1
300 TABLET ORAL DAILY
COMMUNITY
End: 2021-02-02

## 2021-02-02 RX ORDER — VITAMIN B COMPLEX
2000 TABLET ORAL DAILY
COMMUNITY
Start: 2020-06-30

## 2021-02-02 NOTE — H&P
CC: Annual Physical Exam    HPI:   Anum Bishop is a 63year old female who presents for a complete physical exam. Symptoms: is S/P TENA, ovaries preserved.  Patient complains of constipation/loose stool last Spring -- tried [employer] chews -- 2 mike 1 TABLET DAILY 90 tablet 0   • Doxylamine Succinate, Sleep, (UNISOM OR) Take 0.5 tablets by mouth as needed.             Seasonal                Runny nose   Past Medical History:   Diagnosis Date   • Depression    • Endocrine disorder    • H/O mammogram 3/ [facility]  LUNGS: denies shortness of breath with exertion  CARDIOVASCULAR: denies chest pain on exertion  GI: denies abdominal pain,denies heartburn  : denies dysuria, vaginal discharge or itching, in menopause   MUSCULOSKELETAL: denies back pain  NEURO: denie wellburtrin. Last eye exam -- within 6 months  Last dental exam -- Fa1l 2019  Advised to lose weight. On weight watchers. Mood is stable. Will try vagisil and prep wipes for rectal itching. MAy try antihistamine.   OA right thumb -- deferred imaging; ad

## 2021-02-11 ENCOUNTER — HOSPITAL ENCOUNTER (OUTPATIENT)
Dept: MAMMOGRAPHY | Age: 66
Discharge: HOME | End: 2021-02-11
Attending: FAMILY MEDICINE
Payer: COMMERCIAL

## 2021-02-11 DIAGNOSIS — Z12.31 ENCOUNTER FOR SCREENING MAMMOGRAM FOR MALIGNANT NEOPLASM OF BREAST: ICD-10-CM

## 2021-02-11 PROCEDURE — 77063 BREAST TOMOSYNTHESIS BI: CPT | Performed by: FAMILY MEDICINE

## 2021-02-11 PROCEDURE — 77067 SCR MAMMO BI INCL CAD: CPT | Performed by: FAMILY MEDICINE

## 2021-02-15 ENCOUNTER — TELEMEDICINE (OUTPATIENT)
Dept: FAMILY MEDICINE CLINIC | Facility: CLINIC | Age: 66
End: 2021-02-15
Payer: COMMERCIAL

## 2021-02-15 DIAGNOSIS — R92.8 ABNORMAL MAMMOGRAM: Primary | ICD-10-CM

## 2021-02-15 DIAGNOSIS — Z71.89 COUNSELING AND COORDINATION OF CARE: ICD-10-CM

## 2021-02-15 DIAGNOSIS — N64.4 BREAST PAIN, LEFT: ICD-10-CM

## 2021-02-15 DIAGNOSIS — Z80.3 FAMILY HISTORY OF BREAST CANCER IN SISTER: ICD-10-CM

## 2021-02-15 PROCEDURE — 99213 OFFICE O/P EST LOW 20 MIN: CPT | Performed by: FAMILY MEDICINE

## 2021-02-15 NOTE — PROGRESS NOTES
Maxim Balderasdaphneverbally consents to a virtual check in-service on 2/15/2021. Patient understands and accepts the financial responsibility for any deductible, coinsurance, and/or co-pays associated with the service.     Elian Cerna is a 63 Result Value Ref Range    Glucose Urine neg mg/dL    Bilirubin neg Negative    Ketones, UA neg Negative mg/dL    Spec Gravity 1.015 1.005 - 1.030    Blood Urine large Negative    PH Urine 5.5 4.5 - 8.0    Protein Urine neg Negative/Trace mg/dL    Eneida Eid visitation. There are limitations of this visit as no physical exam could be performed. Every conscious effort was taken to allow for sufficient and adequate time.   The billing was spent on reviewing labs, medications, radiology tests, and decision makin

## 2021-02-17 LAB
APPEARANCE: CLEAR
BILIRUBIN: NEGATIVE
CHOL/HDLC RATIO: 3.8 (CALC)
CHOLESTEROL, TOTAL: 212 MG/DL
COLOR: YELLOW
GLUCOSE: NEGATIVE
HDL CHOLESTEROL: 56 MG/DL
KETONES: NEGATIVE
LDL-CHOLESTEROL: 129 MG/DL (CALC)
NITRITE: NEGATIVE
NON-HDL CHOLESTEROL: 156 MG/DL (CALC)
OCCULT BLOOD: NEGATIVE
PH: 7.5 (ref 5–8)
PROTEIN: NEGATIVE
SPECIFIC GRAVITY: 1.01 (ref 1–1.03)
TRIGLYCERIDES: 152 MG/DL

## 2021-02-18 ENCOUNTER — HOSPITAL ENCOUNTER (OUTPATIENT)
Dept: MAMMOGRAPHY | Facility: HOSPITAL | Age: 66
Discharge: HOME | End: 2021-02-18
Attending: FAMILY MEDICINE
Payer: COMMERCIAL

## 2021-02-18 DIAGNOSIS — R92.2 INCONCLUSIVE MAMMOGRAM: ICD-10-CM

## 2021-02-18 DIAGNOSIS — N39.0 URINARY TRACT INFECTION WITHOUT HEMATURIA, SITE UNSPECIFIED: Primary | ICD-10-CM

## 2021-02-18 PROCEDURE — 76642 ULTRASOUND BREAST LIMITED: CPT | Performed by: FAMILY MEDICINE

## 2021-02-18 RX ORDER — AMOXICILLIN AND CLAVULANATE POTASSIUM 875; 125 MG/1; MG/1
1 TABLET, FILM COATED ORAL 2 TIMES DAILY
Qty: 14 TABLET | Refills: 0 | Status: SHIPPED | OUTPATIENT
Start: 2021-02-18 | End: 2021-02-25

## 2021-05-26 ENCOUNTER — NURSE ONLY (OUTPATIENT)
Dept: FAMILY MEDICINE CLINIC | Facility: CLINIC | Age: 66
End: 2021-05-26
Payer: COMMERCIAL

## 2021-05-26 NOTE — PROGRESS NOTES
Pt presents for shingrix vaccine #2. Record shows #1 given 2/2/21.  Pt sts had sl sore arm and fatigue-denies any other reax  Also reports received Covid vaccine series-last dose 4/14/21  Reinforced poss vaccine side effects and conservative management kim

## 2021-06-09 PROCEDURE — 90750 HZV VACC RECOMBINANT IM: CPT | Performed by: FAMILY MEDICINE

## 2021-06-09 PROCEDURE — 90471 IMMUNIZATION ADMIN: CPT | Performed by: FAMILY MEDICINE

## 2022-01-10 ENCOUNTER — HOSPITAL ENCOUNTER (OUTPATIENT)
Dept: ULTRASOUND IMAGING | Facility: HOSPITAL | Age: 67
Discharge: HOME | End: 2022-01-10
Attending: INTERNAL MEDICINE
Payer: COMMERCIAL

## 2022-01-10 DIAGNOSIS — E04.2 MULTIPLE THYROID NODULES: ICD-10-CM

## 2022-01-10 PROCEDURE — 76536 US EXAM OF HEAD AND NECK: CPT | Performed by: INTERNAL MEDICINE

## 2022-01-13 NOTE — PROGRESS NOTES
Zeina Screen, your thyroid ultrasound is overall stable. Please keep your scheduled follow up with Dr Torsten Miller to further discuss.

## 2022-02-18 ENCOUNTER — OFFICE VISIT (OUTPATIENT)
Dept: FAMILY MEDICINE CLINIC | Facility: CLINIC | Age: 67
End: 2022-02-18
Payer: COMMERCIAL

## 2022-02-18 VITALS
WEIGHT: 200 LBS | RESPIRATION RATE: 14 BRPM | BODY MASS INDEX: 35 KG/M2 | SYSTOLIC BLOOD PRESSURE: 142 MMHG | HEART RATE: 68 BPM | DIASTOLIC BLOOD PRESSURE: 82 MMHG | HEIGHT: 63.5 IN

## 2022-02-18 DIAGNOSIS — Z12.4 SCREENING FOR CERVICAL CANCER: ICD-10-CM

## 2022-02-18 DIAGNOSIS — Z00.00 LABORATORY EXAMINATION ORDERED AS PART OF A ROUTINE GENERAL MEDICAL EXAMINATION: ICD-10-CM

## 2022-02-18 DIAGNOSIS — Z00.00 ROUTINE GENERAL MEDICAL EXAMINATION AT A HEALTH CARE FACILITY: Primary | ICD-10-CM

## 2022-02-18 DIAGNOSIS — Z13.820 SCREENING FOR OSTEOPOROSIS: ICD-10-CM

## 2022-02-18 DIAGNOSIS — Z12.31 ENCOUNTER FOR SCREENING MAMMOGRAM FOR MALIGNANT NEOPLASM OF BREAST: ICD-10-CM

## 2022-02-18 PROCEDURE — 3077F SYST BP >= 140 MM HG: CPT | Performed by: FAMILY MEDICINE

## 2022-02-18 PROCEDURE — 3079F DIAST BP 80-89 MM HG: CPT | Performed by: FAMILY MEDICINE

## 2022-02-18 PROCEDURE — 99396 PREV VISIT EST AGE 40-64: CPT | Performed by: FAMILY MEDICINE

## 2022-02-18 PROCEDURE — 3008F BODY MASS INDEX DOCD: CPT | Performed by: FAMILY MEDICINE

## 2022-02-21 ENCOUNTER — HOSPITAL ENCOUNTER (OUTPATIENT)
Dept: MAMMOGRAPHY | Age: 67
Discharge: HOME | End: 2022-02-21
Attending: FAMILY MEDICINE
Payer: COMMERCIAL

## 2022-02-21 ENCOUNTER — HOSPITAL ENCOUNTER (OUTPATIENT)
Dept: BONE DENSITY | Age: 67
Discharge: HOME | End: 2022-02-21
Attending: FAMILY MEDICINE
Payer: COMMERCIAL

## 2022-02-21 DIAGNOSIS — Z13.820 SCREENING FOR OSTEOPOROSIS: ICD-10-CM

## 2022-02-21 DIAGNOSIS — Z12.31 ENCOUNTER FOR SCREENING MAMMOGRAM FOR MALIGNANT NEOPLASM OF BREAST: ICD-10-CM

## 2022-02-21 PROCEDURE — 77067 SCR MAMMO BI INCL CAD: CPT | Performed by: FAMILY MEDICINE

## 2022-02-21 PROCEDURE — 77063 BREAST TOMOSYNTHESIS BI: CPT | Performed by: FAMILY MEDICINE

## 2022-02-21 PROCEDURE — 77080 DXA BONE DENSITY AXIAL: CPT | Performed by: FAMILY MEDICINE

## 2022-05-10 ENCOUNTER — PATIENT MESSAGE (OUTPATIENT)
Dept: FAMILY MEDICINE CLINIC | Facility: CLINIC | Age: 67
End: 2022-05-10

## 2022-05-10 NOTE — TELEPHONE ENCOUNTER
From: Rody Berger  To: Ashley Wong DO  Sent: 5/10/2022 1:28 PM CDT  Subject: LIPID Panel    After my physical on 2/18/2022, I thought you had told me to have a LIPID Panel done. Currently there is no order at [facility] for a LIPID Panel. Or at least, my \"[facility]\" account is showing no orders. I haven't been able to connect with a person to ask. My follow up appointment with you is 5/17/2022. So I was going to try to get it done before I saw you. Or we can discuss this when I see you. Let me know if I should take any action before my follow-up appointment.   Thank you,  Oc Gamino

## 2022-05-12 LAB
CHOL/HDLC RATIO: 2.8 (CALC)
CHOLESTEROL, TOTAL: 196 MG/DL
HDL CHOLESTEROL: 70 MG/DL
LDL-CHOLESTEROL: 105 MG/DL (CALC)
NON-HDL CHOLESTEROL: 126 MG/DL (CALC)
TRIGLYCERIDES: 110 MG/DL

## 2022-05-17 ENCOUNTER — OFFICE VISIT (OUTPATIENT)
Dept: FAMILY MEDICINE CLINIC | Facility: CLINIC | Age: 67
End: 2022-05-17
Payer: COMMERCIAL

## 2022-05-17 ENCOUNTER — TELEPHONE (OUTPATIENT)
Dept: FAMILY MEDICINE CLINIC | Facility: CLINIC | Age: 67
End: 2022-05-17

## 2022-05-17 VITALS
BODY MASS INDEX: 35 KG/M2 | TEMPERATURE: 97 F | OXYGEN SATURATION: 96 % | HEART RATE: 68 BPM | HEIGHT: 63.5 IN | DIASTOLIC BLOOD PRESSURE: 82 MMHG | SYSTOLIC BLOOD PRESSURE: 124 MMHG | RESPIRATION RATE: 14 BRPM | WEIGHT: 200 LBS

## 2022-05-17 DIAGNOSIS — E03.9 ACQUIRED HYPOTHYROIDISM: ICD-10-CM

## 2022-05-17 DIAGNOSIS — E04.1 THYROID NODULE: ICD-10-CM

## 2022-05-17 DIAGNOSIS — K21.00 GASTROESOPHAGEAL REFLUX DISEASE WITH ESOPHAGITIS WITHOUT HEMORRHAGE: ICD-10-CM

## 2022-05-17 DIAGNOSIS — Z71.85 VACCINE COUNSELING: ICD-10-CM

## 2022-05-17 DIAGNOSIS — F43.21 GRIEF: ICD-10-CM

## 2022-05-17 DIAGNOSIS — I10 ESSENTIAL HYPERTENSION: Primary | ICD-10-CM

## 2022-05-17 DIAGNOSIS — E55.9 VITAMIN D DEFICIENCY: ICD-10-CM

## 2022-05-17 DIAGNOSIS — E66.9 OBESITY (BMI 30.0-34.9): ICD-10-CM

## 2022-05-17 DIAGNOSIS — Z79.899 MEDICATION MANAGEMENT: ICD-10-CM

## 2022-05-17 DIAGNOSIS — G47.33 OSA (OBSTRUCTIVE SLEEP APNEA): ICD-10-CM

## 2022-05-17 DIAGNOSIS — E21.3 HYPERPARATHYROIDISM, UNSPECIFIED (HCC): ICD-10-CM

## 2022-05-17 DIAGNOSIS — E78.5 DYSLIPIDEMIA: ICD-10-CM

## 2022-05-17 PROCEDURE — 3074F SYST BP LT 130 MM HG: CPT | Performed by: FAMILY MEDICINE

## 2022-05-17 PROCEDURE — 99215 OFFICE O/P EST HI 40 MIN: CPT | Performed by: FAMILY MEDICINE

## 2022-05-17 PROCEDURE — 3008F BODY MASS INDEX DOCD: CPT | Performed by: FAMILY MEDICINE

## 2022-05-17 PROCEDURE — 3079F DIAST BP 80-89 MM HG: CPT | Performed by: FAMILY MEDICINE

## 2022-05-17 RX ORDER — VALSARTAN AND HYDROCHLOROTHIAZIDE 80; 12.5 MG/1; MG/1
1 TABLET, FILM COATED ORAL DAILY
Qty: 30 TABLET | Refills: 2 | Status: SHIPPED | OUTPATIENT
Start: 2022-05-17 | End: 2023-05-12

## 2022-05-20 ENCOUNTER — TELEPHONE (OUTPATIENT)
Dept: FAMILY MEDICINE CLINIC | Facility: CLINIC | Age: 67
End: 2022-05-20

## 2022-05-20 NOTE — TELEPHONE ENCOUNTER
Hold medicine. See how she feels tomorrow. If the symptoms are worsening, proceed to the urgent care or emergency room for further evaluation.

## 2022-05-20 NOTE — TELEPHONE ENCOUNTER
Rosemarie Jimenez was put on blood pressure medication earlier this week, and she has been experiencing dizziness and jaw pain, her blood pressure was 142/84 earlier in the week and today it is 11/88 Please call Rosemarie Jimenez at [phone]

## 2022-05-20 NOTE — TELEPHONE ENCOUNTER
Took first dose of ValsartanHct 80/12.5 mg yesterday. Woke up with jaw pain on the Right side, although she does grind her teeth this is never this bad. Pain located in Right TMJ. Took the Rx again this AM and has been dizzy ever since (this AM's dose)  and pain has been constant. The dizziness is more of  Dysequilibrium, not spinning. BP yesterday 142/84 and 111/88 today.  HR right now is 90 and O2 96%  Denies CP/SOB

## 2022-05-23 NOTE — TELEPHONE ENCOUNTER
Misunderstood so she did not HOLD Rx. Taking in AM.  Over-all feeling ok. Jaw pain subsided Friday night. Dysequilibrium is only when she is up and down with gardening. Laid [place] on Sat. Gardened Sun.and today. \"vision a little wavy\". She means one eye trouble focusing. \"like an aura\". Mild H/A but not migraine.    121/82, 133/80 Saturday  130/78 Sunday  135/89 today

## 2022-06-27 ENCOUNTER — PATIENT MESSAGE (OUTPATIENT)
Dept: FAMILY MEDICINE CLINIC | Facility: CLINIC | Age: 67
End: 2022-06-27

## 2022-06-27 RX ORDER — LEVOTHYROXINE SODIUM 0.05 MG/1
50 TABLET ORAL DAILY
COMMUNITY

## 2022-06-27 RX ORDER — ERGOCALCIFEROL 1.25 MG/1
50000 CAPSULE ORAL WEEKLY
COMMUNITY

## 2022-06-27 NOTE — TELEPHONE ENCOUNTER
From: Erika Ibrahim  To: Sydnee Coffey DO  Sent: 6/27/2022 3:45 PM CDT  Subject: Prescription Medicines removed from [address]    My two prescriptions have been removed from my [facility] record. I have added them back in. They are:  1- Levothyroxine Sodium 50 MCG, Tablet, Once daily  2- Vitamin D 50,000 units, capsule, once weekly    Since MyChart is my medical record, it disturbs me that these were removed. Should I speak to anyone else?   Thanks,  Hailey Will

## 2022-06-29 ENCOUNTER — OFFICE VISIT (OUTPATIENT)
Dept: FAMILY MEDICINE CLINIC | Facility: CLINIC | Age: 67
End: 2022-06-29
Payer: COMMERCIAL

## 2022-06-29 VITALS
HEART RATE: 58 BPM | OXYGEN SATURATION: 98 % | RESPIRATION RATE: 16 BRPM | SYSTOLIC BLOOD PRESSURE: 122 MMHG | WEIGHT: 197 LBS | DIASTOLIC BLOOD PRESSURE: 68 MMHG | BODY MASS INDEX: 34.47 KG/M2 | TEMPERATURE: 97 F | HEIGHT: 63.5 IN

## 2022-06-29 DIAGNOSIS — Z23 NEED FOR VACCINATION: ICD-10-CM

## 2022-06-29 DIAGNOSIS — Z71.85 VACCINE COUNSELING: ICD-10-CM

## 2022-06-29 DIAGNOSIS — I10 ESSENTIAL HYPERTENSION: Primary | ICD-10-CM

## 2022-06-29 DIAGNOSIS — E66.9 OBESITY (BMI 30.0-34.9): ICD-10-CM

## 2022-06-29 DIAGNOSIS — L29.9 ITCHY SCALP: ICD-10-CM

## 2022-06-29 PROCEDURE — 99214 OFFICE O/P EST MOD 30 MIN: CPT | Performed by: FAMILY MEDICINE

## 2022-06-29 PROCEDURE — 3008F BODY MASS INDEX DOCD: CPT | Performed by: FAMILY MEDICINE

## 2022-06-29 PROCEDURE — 90715 TDAP VACCINE 7 YRS/> IM: CPT | Performed by: FAMILY MEDICINE

## 2022-06-29 PROCEDURE — 3078F DIAST BP <80 MM HG: CPT | Performed by: FAMILY MEDICINE

## 2022-06-29 PROCEDURE — 3074F SYST BP LT 130 MM HG: CPT | Performed by: FAMILY MEDICINE

## 2022-06-29 PROCEDURE — 90471 IMMUNIZATION ADMIN: CPT | Performed by: FAMILY MEDICINE

## 2022-07-01 ENCOUNTER — PATIENT MESSAGE (OUTPATIENT)
Dept: FAMILY MEDICINE CLINIC | Facility: CLINIC | Age: 67
End: 2022-07-01

## 2022-07-01 NOTE — TELEPHONE ENCOUNTER
From: Justin Unger  To: Gena Curran DO  Sent: 7/1/2022 12:11 PM CDT  Subject: Heart Scan Order    I have scheduled a Heart Scan for tomorrow, July 2. In reading the instructions, I see that they need an order from my physician. Not sure that an order was issued. I'm sending this message to check.   Thank you,  Efren Carlos

## 2022-07-02 ENCOUNTER — HOSPITAL ENCOUNTER (OUTPATIENT)
Dept: CT IMAGING | Facility: HOSPITAL | Age: 67
Discharge: HOME | End: 2022-07-02
Attending: FAMILY MEDICINE

## 2022-07-02 VITALS — HEIGHT: 63 IN | WEIGHT: 195 LBS | BODY MASS INDEX: 34.55 KG/M2

## 2022-07-02 DIAGNOSIS — Z13.6 SCREENING FOR CARDIOVASCULAR CONDITION: ICD-10-CM

## 2022-07-04 NOTE — PROGRESS NOTES
Dear Elly Cordero,     This is an over read for the non-cardiac, non-vascular limited visualized portions of the chest and abdomen from your CT calcium scoring test. Everything is within normal limits. This test results comes back before the heart scan result. Please be patient for that result. I will send it over once I have received and reviewed it.      Sincerely,    Dr. Kimberly Smalls

## 2022-07-08 DIAGNOSIS — R93.89 ABNORMAL FINDING ON CT SCAN: ICD-10-CM

## 2022-07-08 DIAGNOSIS — I77.810 DILATATION OF THORACIC AORTA (HCC): Primary | ICD-10-CM

## 2022-07-15 NOTE — IMAGING NOTE
Call placed to pt regarding CTA Gated thoracic on 7/19/2022. Instructed to arrive at 8:30am. Instructed to drink plenty of fluids a day prior to procedure and day of procedure. May eat a light breakfast/lunch. Advised to hold caffeine 12 hrs prior to procedure. Take all meds.

## 2022-07-19 ENCOUNTER — HOSPITAL ENCOUNTER (OUTPATIENT)
Dept: CT IMAGING | Facility: HOSPITAL | Age: 67
Discharge: HOME | End: 2022-07-19
Attending: FAMILY MEDICINE
Payer: COMMERCIAL

## 2022-07-19 VITALS — SYSTOLIC BLOOD PRESSURE: 111 MMHG | DIASTOLIC BLOOD PRESSURE: 65 MMHG | HEART RATE: 63 BPM

## 2022-07-19 DIAGNOSIS — I77.810 DILATATION OF THORACIC AORTA (HCC): ICD-10-CM

## 2022-07-19 DIAGNOSIS — R93.89 ABNORMAL FINDING ON CT SCAN: ICD-10-CM

## 2022-07-19 LAB — CREAT BLD-MCNC: 0.7 MG/DL

## 2022-07-19 PROCEDURE — 82565 ASSAY OF CREATININE: CPT

## 2022-07-19 PROCEDURE — 71275 CT ANGIOGRAPHY CHEST: CPT | Performed by: FAMILY MEDICINE

## 2022-07-19 NOTE — PROGRESS NOTES
Dear Nanette Donald,     This is an over read for the non-cardiac, non-vascular limited visualized portions of the chest and abdomen from your CT calcium scoring test. Everything is within normal limits. This test results comes back before the heart scan result. Please be patient for that result. I will send it over once I have received and reviewed it.      Sincerely,    Dr. Lavon Hathaway

## 2022-07-19 NOTE — IMAGING NOTE
Received pt to CT 4,  at 0930. Pt verified name, , and allergies. Contrast= 85 ml  Saline= 75 ml  Average HR= 61  Pt tolerated exam well. Exam finished at [address] . Pt escorted to dressing room with steady gait.

## 2022-07-21 DIAGNOSIS — I71.2 THORACIC AORTIC ANEURYSM WITHOUT RUPTURE (HCC): Primary | ICD-10-CM

## 2022-08-11 DIAGNOSIS — I10 ESSENTIAL HYPERTENSION: Primary | ICD-10-CM

## 2022-08-15 RX ORDER — VALSARTAN AND HYDROCHLOROTHIAZIDE 80; 12.5 MG/1; MG/1
TABLET, FILM COATED ORAL
Qty: 30 TABLET | Refills: 0 | OUTPATIENT
Start: 2022-08-15

## 2022-08-15 RX ORDER — VALSARTAN AND HYDROCHLOROTHIAZIDE 80; 12.5 MG/1; MG/1
1 TABLET, FILM COATED ORAL DAILY
Qty: 30 TABLET | Refills: 2 | Status: SHIPPED | OUTPATIENT
Start: 2022-08-15 | End: 2023-08-10

## 2022-09-07 LAB
AMB EXT BILIRUBIN, TOTAL: 0.4 MG/DL (ref 0.2–1.2)
AMB EXT BUN: 11 MG/DL (ref 7–25)
AMB EXT CALCIUM: 10 (ref 8.6–10.4)
AMB EXT CARBON DIOXIDE: 29 (ref 20–32)
AMB EXT CHLORIDE: 105 (ref 98–110)
AMB EXT CMP ALT: 16 U/L (ref 6–29)
AMB EXT CMP AST: 17 U/L (ref 10–35)
AMB EXT CREATININE: 0.7 MG/DL (ref 0.5–1.05)
AMB EXT GLUCOSE: 90 MG/DL (ref 65–99)
AMB EXT POSTASSIUM: 4.3 MMOL/L (ref 3.5–5.3)
AMB EXT SODIUM: 139 MMOL/L (ref 135–146)
AMB EXT TOTAL PROTEIN: 6.3 (ref 6.1–8.1)

## 2022-09-15 ENCOUNTER — TELEMEDICINE (OUTPATIENT)
Dept: FAMILY MEDICINE CLINIC | Facility: CLINIC | Age: 67
End: 2022-09-15
Payer: MEDICARE

## 2022-09-15 DIAGNOSIS — U07.1 COVID-19: Primary | ICD-10-CM

## 2022-09-15 PROCEDURE — 99212 OFFICE O/P EST SF 10 MIN: CPT | Performed by: STUDENT IN AN ORGANIZED HEALTH CARE EDUCATION/TRAINING PROGRAM

## 2022-09-20 ENCOUNTER — TELEPHONE (OUTPATIENT)
Dept: FAMILY MEDICINE CLINIC | Facility: CLINIC | Age: 67
End: 2022-09-20

## 2022-11-07 DIAGNOSIS — I10 ESSENTIAL HYPERTENSION: ICD-10-CM

## 2022-11-07 RX ORDER — VALSARTAN AND HYDROCHLOROTHIAZIDE 80; 12.5 MG/1; MG/1
1 TABLET, FILM COATED ORAL DAILY
Qty: 30 TABLET | Refills: 0 | Status: SHIPPED | OUTPATIENT
Start: 2022-11-07 | End: 2023-11-02

## 2022-11-28 ENCOUNTER — OFFICE VISIT (OUTPATIENT)
Dept: FAMILY MEDICINE CLINIC | Facility: CLINIC | Age: 67
End: 2022-11-28
Payer: MEDICARE

## 2022-11-28 VITALS
RESPIRATION RATE: 16 BRPM | OXYGEN SATURATION: 99 % | SYSTOLIC BLOOD PRESSURE: 110 MMHG | HEART RATE: 83 BPM | HEIGHT: 63 IN | DIASTOLIC BLOOD PRESSURE: 72 MMHG | BODY MASS INDEX: 35.26 KG/M2 | WEIGHT: 199 LBS

## 2022-11-28 DIAGNOSIS — E78.5 DYSLIPIDEMIA: ICD-10-CM

## 2022-11-28 DIAGNOSIS — E55.9 VITAMIN D DEFICIENCY: ICD-10-CM

## 2022-11-28 DIAGNOSIS — I10 ESSENTIAL HYPERTENSION: Primary | ICD-10-CM

## 2022-11-28 DIAGNOSIS — Z79.899 MEDICATION MANAGEMENT: ICD-10-CM

## 2022-11-28 DIAGNOSIS — Z71.85 VACCINE COUNSELING: ICD-10-CM

## 2022-11-28 DIAGNOSIS — E89.0 POSTOPERATIVE HYPOTHYROIDISM: ICD-10-CM

## 2022-11-28 DIAGNOSIS — Z12.31 SCREENING MAMMOGRAM FOR BREAST CANCER: ICD-10-CM

## 2022-11-28 DIAGNOSIS — G47.33 OSA (OBSTRUCTIVE SLEEP APNEA): ICD-10-CM

## 2022-11-28 DIAGNOSIS — E66.9 OBESITY (BMI 30-39.9): ICD-10-CM

## 2022-11-28 DIAGNOSIS — Z86.16 HISTORY OF COVID-19: ICD-10-CM

## 2022-11-28 DIAGNOSIS — E21.3 HYPERPARATHYROIDISM, UNSPECIFIED (HCC): ICD-10-CM

## 2022-11-28 PROCEDURE — 3078F DIAST BP <80 MM HG: CPT | Performed by: FAMILY MEDICINE

## 2022-11-28 PROCEDURE — 3074F SYST BP LT 130 MM HG: CPT | Performed by: FAMILY MEDICINE

## 2022-11-28 PROCEDURE — 99214 OFFICE O/P EST MOD 30 MIN: CPT | Performed by: FAMILY MEDICINE

## 2022-11-28 PROCEDURE — 3008F BODY MASS INDEX DOCD: CPT | Performed by: FAMILY MEDICINE

## 2022-11-28 RX ORDER — VALSARTAN AND HYDROCHLOROTHIAZIDE 80; 12.5 MG/1; MG/1
1 TABLET, FILM COATED ORAL DAILY
Qty: 90 TABLET | Refills: 1 | Status: SHIPPED | OUTPATIENT
Start: 2022-11-28 | End: 2023-11-23

## 2022-12-06 LAB
ABSOLUTE BASOPHILS: 42 CELLS/UL (ref 0–200)
ABSOLUTE EOSINOPHILS: 140 CELLS/UL (ref 15–500)
ABSOLUTE LYMPHOCYTES: 1607 CELLS/UL (ref 850–3900)
ABSOLUTE MONOCYTES: 452 CELLS/UL (ref 200–950)
ABSOLUTE NEUTROPHILS: 2959 CELLS/UL (ref 1500–7800)
APPEARANCE: CLEAR
BASOPHILS: 0.8 %
BILIRUBIN: NEGATIVE
CHOL/HDLC RATIO: 3.4 (CALC)
CHOLESTEROL, TOTAL: 205 MG/DL
COLOR: YELLOW
EOSINOPHILS: 2.7 %
GLUCOSE: NEGATIVE
HDL CHOLESTEROL: 60 MG/DL
HEMATOCRIT: 41.3 % (ref 35–45)
HEMOGLOBIN A1C: 5.4 % OF TOTAL HGB
HEMOGLOBIN: 13.8 G/DL (ref 11.7–15.5)
KETONES: NEGATIVE
LDL-CHOLESTEROL: 117 MG/DL (CALC)
LYMPHOCYTES: 30.9 %
MCH: 29.9 PG (ref 27–33)
MCHC: 33.4 G/DL (ref 32–36)
MCV: 89.4 FL (ref 80–100)
MONOCYTES: 8.7 %
MPV: 10 FL (ref 7.5–12.5)
NEUTROPHILS: 56.9 %
NITRITE: NEGATIVE
NON-HDL CHOLESTEROL: 145 MG/DL (CALC)
OCCULT BLOOD: NEGATIVE
PH: 7 (ref 5–8)
PLATELET COUNT: 228 THOUSAND/UL (ref 140–400)
PROTEIN: NEGATIVE
RDW: 13.1 % (ref 11–15)
RED BLOOD CELL COUNT: 4.62 MILLION/UL (ref 3.8–5.1)
SPECIFIC GRAVITY: 1.01 (ref 1–1.03)
TRIGLYCERIDES: 167 MG/DL
WHITE BLOOD CELL COUNT: 5.2 THOUSAND/UL (ref 3.8–10.8)

## 2023-02-14 ENCOUNTER — HOSPITAL ENCOUNTER (OUTPATIENT)
Dept: ULTRASOUND IMAGING | Facility: HOSPITAL | Age: 68
Discharge: HOME | End: 2023-02-14
Attending: OTOLARYNGOLOGY
Payer: MEDICARE

## 2023-02-14 DIAGNOSIS — E04.1 THYROID NODULE: ICD-10-CM

## 2023-02-14 PROCEDURE — 76536 US EXAM OF HEAD AND NECK: CPT | Performed by: OTOLARYNGOLOGY

## 2023-02-23 ENCOUNTER — HOSPITAL ENCOUNTER (OUTPATIENT)
Dept: MAMMOGRAPHY | Age: 68
Discharge: HOME | End: 2023-02-23
Attending: FAMILY MEDICINE
Payer: MEDICARE

## 2023-02-23 DIAGNOSIS — Z12.31 SCREENING MAMMOGRAM FOR BREAST CANCER: ICD-10-CM

## 2023-02-23 PROCEDURE — 77067 SCR MAMMO BI INCL CAD: CPT | Performed by: FAMILY MEDICINE

## 2023-02-23 PROCEDURE — 77063 BREAST TOMOSYNTHESIS BI: CPT | Performed by: FAMILY MEDICINE

## 2023-03-08 ENCOUNTER — OFFICE VISIT (OUTPATIENT)
Dept: PODIATRY CLINIC | Facility: CLINIC | Age: 68
End: 2023-03-08

## 2023-03-08 DIAGNOSIS — M72.2 PLANTAR FASCIITIS OF RIGHT FOOT: ICD-10-CM

## 2023-03-08 DIAGNOSIS — M79.671 INFLAMMATORY HEEL PAIN, RIGHT: Primary | ICD-10-CM

## 2023-03-15 ENCOUNTER — OFFICE VISIT (OUTPATIENT)
Dept: FAMILY MEDICINE CLINIC | Facility: CLINIC | Age: 68
End: 2023-03-15
Payer: MEDICARE

## 2023-03-15 ENCOUNTER — PATIENT MESSAGE (OUTPATIENT)
Dept: FAMILY MEDICINE CLINIC | Facility: CLINIC | Age: 68
End: 2023-03-15

## 2023-03-15 VITALS
DIASTOLIC BLOOD PRESSURE: 70 MMHG | BODY MASS INDEX: 34.82 KG/M2 | RESPIRATION RATE: 16 BRPM | SYSTOLIC BLOOD PRESSURE: 114 MMHG | HEIGHT: 63.5 IN | OXYGEN SATURATION: 100 % | HEART RATE: 68 BPM | WEIGHT: 199 LBS

## 2023-03-15 DIAGNOSIS — Z71.85 VACCINE COUNSELING: ICD-10-CM

## 2023-03-15 DIAGNOSIS — G47.33 OSA (OBSTRUCTIVE SLEEP APNEA): ICD-10-CM

## 2023-03-15 DIAGNOSIS — Z00.00 ENCOUNTER FOR ANNUAL HEALTH EXAMINATION: Primary | ICD-10-CM

## 2023-03-15 DIAGNOSIS — E89.0 POSTOPERATIVE HYPOTHYROIDISM: ICD-10-CM

## 2023-03-15 DIAGNOSIS — D32.0 BENIGN NEOPLASM OF CEREBRAL MENINGES (HCC): ICD-10-CM

## 2023-03-15 DIAGNOSIS — M72.2 PLANTAR FASCIITIS: ICD-10-CM

## 2023-03-15 DIAGNOSIS — E78.1 PURE HYPERGLYCERIDEMIA: ICD-10-CM

## 2023-03-15 DIAGNOSIS — E66.9 OBESITY (BMI 30-39.9): ICD-10-CM

## 2023-03-15 DIAGNOSIS — E04.2 MULTIPLE THYROID NODULES: ICD-10-CM

## 2023-03-15 DIAGNOSIS — I71.20 THORACIC AORTIC ANEURYSM WITHOUT RUPTURE, UNSPECIFIED PART (HCC): ICD-10-CM

## 2023-03-15 DIAGNOSIS — Z12.4 SCREENING FOR MALIGNANT NEOPLASM OF CERVIX: ICD-10-CM

## 2023-03-15 DIAGNOSIS — Z79.899 MEDICATION MANAGEMENT: ICD-10-CM

## 2023-03-15 DIAGNOSIS — E66.9 OBESITY (BMI 30.0-34.9): ICD-10-CM

## 2023-03-15 DIAGNOSIS — M25.562 ACUTE PAIN OF LEFT KNEE: ICD-10-CM

## 2023-03-15 DIAGNOSIS — D35.1 PARATHYROID ADENOMA: ICD-10-CM

## 2023-03-15 DIAGNOSIS — I10 ESSENTIAL HYPERTENSION: ICD-10-CM

## 2023-03-15 DIAGNOSIS — E21.3 HYPERPARATHYROIDISM (HCC): ICD-10-CM

## 2023-03-15 DIAGNOSIS — Z12.11 SCREENING FOR MALIGNANT NEOPLASM OF COLON: ICD-10-CM

## 2023-03-15 DIAGNOSIS — K21.00 GASTROESOPHAGEAL REFLUX DISEASE WITH ESOPHAGITIS WITHOUT HEMORRHAGE: ICD-10-CM

## 2023-03-15 DIAGNOSIS — Z86.16 HISTORY OF COVID-19: ICD-10-CM

## 2023-03-15 DIAGNOSIS — E78.5 DYSLIPIDEMIA: ICD-10-CM

## 2023-03-15 DIAGNOSIS — E55.9 VITAMIN D DEFICIENCY: ICD-10-CM

## 2023-03-15 DIAGNOSIS — H81.12 BENIGN PAROXYSMAL POSITIONAL VERTIGO OF LEFT EAR: ICD-10-CM

## 2023-03-15 NOTE — TELEPHONE ENCOUNTER
From: Erika Ibrahim  To: Sydnee Coffey DO  Sent: 3/15/2023 2:47 PM CDT  Subject: forgot this morning. .. Can I take ibuprofen for my plantar fasciitis, considering the blood pressure medicine that I'm on? BTW: The Epley maneuver has corrected my dizziness.   Thank you,  Hailey Will

## 2023-04-04 ENCOUNTER — TELEPHONE (OUTPATIENT)
Dept: PODIATRY CLINIC | Facility: CLINIC | Age: 68
End: 2023-04-04

## 2023-04-04 NOTE — TELEPHONE ENCOUNTER
Pt is getting cortisone inj on Thursday - right foot - asking if it will affect her ability to drive home

## 2023-04-05 NOTE — TELEPHONE ENCOUNTER
S/w pt and informed her there are no driving restrictions after a cortisone injection. She had no further q's.

## 2023-04-06 ENCOUNTER — OFFICE VISIT (OUTPATIENT)
Dept: PODIATRY CLINIC | Facility: CLINIC | Age: 68
End: 2023-04-06

## 2023-04-06 VITALS — DIASTOLIC BLOOD PRESSURE: 72 MMHG | SYSTOLIC BLOOD PRESSURE: 134 MMHG

## 2023-04-06 DIAGNOSIS — M72.2 PLANTAR FASCIITIS OF RIGHT FOOT: Primary | ICD-10-CM

## 2023-04-06 DIAGNOSIS — M77.31 CALCANEAL SPUR OF RIGHT FOOT: ICD-10-CM

## 2023-04-06 DIAGNOSIS — M79.671 INFLAMMATORY HEEL PAIN, RIGHT: ICD-10-CM

## 2023-04-06 RX ORDER — TRIAMCINOLONE ACETONIDE 40 MG/ML
20 INJECTION, SUSPENSION INTRA-ARTICULAR; INTRAMUSCULAR ONCE
Status: COMPLETED | OUTPATIENT
Start: 2023-04-06 | End: 2023-04-06

## 2023-04-06 NOTE — PROGRESS NOTES
Per Dr. Jones Khan draw up 0.5ml of 0.5% Marcaine and 0.5ml of Kenalog 40 for injection to right foot.

## 2023-05-02 ENCOUNTER — OFFICE VISIT (OUTPATIENT)
Dept: PODIATRY CLINIC | Facility: CLINIC | Age: 68
End: 2023-05-02

## 2023-05-02 DIAGNOSIS — M79.671 INFLAMMATORY HEEL PAIN, RIGHT: ICD-10-CM

## 2023-05-02 DIAGNOSIS — M77.31 CALCANEAL SPUR OF RIGHT FOOT: ICD-10-CM

## 2023-05-02 DIAGNOSIS — M72.2 PLANTAR FASCIITIS OF RIGHT FOOT: Primary | ICD-10-CM

## 2023-05-02 PROCEDURE — 99213 OFFICE O/P EST LOW 20 MIN: CPT | Performed by: PODIATRIST

## 2023-06-02 LAB
ABSOLUTE BASOPHILS: 32 CELLS/UL (ref 0–200)
ABSOLUTE EOSINOPHILS: 170 CELLS/UL (ref 15–500)
ABSOLUTE LYMPHOCYTES: 1733 CELLS/UL (ref 850–3900)
ABSOLUTE MONOCYTES: 387 CELLS/UL (ref 200–950)
ABSOLUTE NEUTROPHILS: 2979 CELLS/UL (ref 1500–7800)
ALBUMIN/GLOBULIN RATIO: 1.5 (CALC) (ref 1–2.5)
ALBUMIN: 3.8 G/DL (ref 3.6–5.1)
ALKALINE PHOSPHATASE: 52 U/L (ref 37–153)
ALT: 21 U/L (ref 6–29)
APPEARANCE: CLEAR
AST: 18 U/L (ref 10–35)
BASOPHILS: 0.6 %
BILIRUBIN, TOTAL: 0.4 MG/DL (ref 0.2–1.2)
BILIRUBIN: NEGATIVE
BUN: 13 MG/DL (ref 7–25)
CALCIUM: 9.8 MG/DL (ref 8.6–10.4)
CALCIUM: 9.8 MG/DL (ref 8.6–10.4)
CARBON DIOXIDE: 27 MMOL/L (ref 20–32)
CHLORIDE: 106 MMOL/L (ref 98–110)
CHOL/HDLC RATIO: 3.3 (CALC)
CHOLESTEROL, TOTAL: 200 MG/DL
COLOR: YELLOW
CREATININE: 0.69 MG/DL (ref 0.5–1.05)
EGFR: 96 ML/MIN/1.73M2
EOSINOPHILS: 3.2 %
GLOBULIN: 2.5 G/DL (CALC) (ref 1.9–3.7)
GLUCOSE: 84 MG/DL (ref 65–99)
GLUCOSE: NEGATIVE
HDL CHOLESTEROL: 61 MG/DL
HEMATOCRIT: 41.2 % (ref 35–45)
HEMOGLOBIN A1C: 5.2 % OF TOTAL HGB
HEMOGLOBIN: 13.2 G/DL (ref 11.7–15.5)
KETONES: NEGATIVE
LDL-CHOLESTEROL: 109 MG/DL (CALC)
LYMPHOCYTES: 32.7 %
MCH: 29.2 PG (ref 27–33)
MCHC: 32 G/DL (ref 32–36)
MCV: 91.2 FL (ref 80–100)
MONOCYTES: 7.3 %
MPV: 9.8 FL (ref 7.5–12.5)
NEUTROPHILS: 56.2 %
NITRITE: NEGATIVE
NON-HDL CHOLESTEROL: 139 MG/DL (CALC)
OCCULT BLOOD: NEGATIVE
PARATHYROID HORMONE,$INTACT: 55 PG/ML (ref 16–77)
PH: 7.5 (ref 5–8)
PLATELET COUNT: 230 THOUSAND/UL (ref 140–400)
POTASSIUM: 4.2 MMOL/L (ref 3.5–5.3)
PROTEIN, TOTAL: 6.3 G/DL (ref 6.1–8.1)
PROTEIN: NEGATIVE
RDW: 13.3 % (ref 11–15)
RED BLOOD CELL COUNT: 4.52 MILLION/UL (ref 3.8–5.1)
SODIUM: 139 MMOL/L (ref 135–146)
SPECIFIC GRAVITY: 1.01 (ref 1–1.03)
T4, FREE: 1.1 NG/DL (ref 0.8–1.8)
TRIGLYCERIDES: 179 MG/DL
TSH: 1 MIU/L (ref 0.4–4.5)
VITAMIN D, 25-OH, TOTAL: 78 NG/ML (ref 30–100)
WHITE BLOOD CELL COUNT: 5.3 THOUSAND/UL (ref 3.8–10.8)

## 2023-06-12 ENCOUNTER — PATIENT MESSAGE (OUTPATIENT)
Dept: FAMILY MEDICINE CLINIC | Facility: CLINIC | Age: 68
End: 2023-06-12

## 2023-06-12 ENCOUNTER — OFFICE VISIT (OUTPATIENT)
Dept: FAMILY MEDICINE CLINIC | Facility: CLINIC | Age: 68
End: 2023-06-12
Payer: MEDICARE

## 2023-06-12 VITALS
WEIGHT: 198 LBS | RESPIRATION RATE: 16 BRPM | OXYGEN SATURATION: 99 % | DIASTOLIC BLOOD PRESSURE: 60 MMHG | TEMPERATURE: 99 F | SYSTOLIC BLOOD PRESSURE: 100 MMHG | HEART RATE: 72 BPM | HEIGHT: 63.5 IN | BODY MASS INDEX: 34.65 KG/M2

## 2023-06-12 DIAGNOSIS — L02.11 CUTANEOUS ABSCESS OF NECK: Primary | ICD-10-CM

## 2023-06-12 PROCEDURE — 3074F SYST BP LT 130 MM HG: CPT | Performed by: FAMILY MEDICINE

## 2023-06-12 PROCEDURE — 99213 OFFICE O/P EST LOW 20 MIN: CPT | Performed by: FAMILY MEDICINE

## 2023-06-12 PROCEDURE — 3008F BODY MASS INDEX DOCD: CPT | Performed by: FAMILY MEDICINE

## 2023-06-12 PROCEDURE — 3078F DIAST BP <80 MM HG: CPT | Performed by: FAMILY MEDICINE

## 2023-06-12 RX ORDER — ERGOCALCIFEROL 1.25 MG/1
50000 CAPSULE ORAL WEEKLY
COMMUNITY
Start: 2023-05-04

## 2023-06-12 RX ORDER — AMOXICILLIN AND CLAVULANATE POTASSIUM 875; 125 MG/1; MG/1
1 TABLET, FILM COATED ORAL 2 TIMES DAILY
Qty: 20 TABLET | Refills: 0 | Status: SHIPPED | OUTPATIENT
Start: 2023-06-12 | End: 2023-06-22

## 2023-06-12 NOTE — TELEPHONE ENCOUNTER
From: Douglas Jackson  To: Max Mustafa DO  Sent: 6/12/2023 10:01 AM CDT  Subject: Something curious on my neck    A week ago, I noticed a red bump on my neck. It was about the size of a pea and it itched. This side of my neck had been itchy for awhile before the bump appeared. Not sure if the two are related. When the bump developed, I assumed it was a bug bite of some sort. When the itching became bad, I put some cortisone cream on it. By the weekend it was less swollen but larger. It itched less and seemed to have developed a lighter colored center. It's now about the size of a quarter. This morning it's still the same size and the lighter colored dot is no longer in the center. It appears that the redden area has moved some. Should I just wait this out or have someone look at it?   Cash Rodriguez  [phone]

## 2023-06-12 NOTE — TELEPHONE ENCOUNTER
Cesar Hernandez [facility] [[email or web address]] 3-15-23  [facility] [employer] 1-90-92  Please see [facility] message. Thank you.

## 2023-06-12 NOTE — TELEPHONE ENCOUNTER
Called and left Nerissa Madrigal a message asking to call the office back. Dr. Yumiko Crandall would like her to come into the office to be seen so Dr. Yumiko Crandall can see this bump on the neck. Awaiting a return call from Nerissa Madrigal.

## 2023-06-18 DIAGNOSIS — I10 ESSENTIAL HYPERTENSION: ICD-10-CM

## 2023-06-19 RX ORDER — VALSARTAN AND HYDROCHLOROTHIAZIDE 80; 12.5 MG/1; MG/1
1 TABLET, FILM COATED ORAL DAILY
Qty: 90 TABLET | Refills: 1 | Status: SHIPPED | OUTPATIENT
Start: 2023-06-19 | End: 2024-06-13

## 2023-06-22 ENCOUNTER — TELEPHONE (OUTPATIENT)
Dept: FAMILY MEDICINE CLINIC | Facility: CLINIC | Age: 68
End: 2023-06-22

## 2023-06-22 NOTE — TELEPHONE ENCOUNTER
Patient was treated for a neck rash and that has cleared up but now she has hives. She would like to know what to do?

## 2023-06-23 ENCOUNTER — OFFICE VISIT (OUTPATIENT)
Dept: FAMILY MEDICINE CLINIC | Facility: CLINIC | Age: 68
End: 2023-06-23
Payer: MEDICARE

## 2023-06-23 VITALS
BODY MASS INDEX: 34.3 KG/M2 | RESPIRATION RATE: 16 BRPM | DIASTOLIC BLOOD PRESSURE: 68 MMHG | HEART RATE: 69 BPM | WEIGHT: 196 LBS | SYSTOLIC BLOOD PRESSURE: 110 MMHG | OXYGEN SATURATION: 96 % | HEIGHT: 63.5 IN

## 2023-06-23 DIAGNOSIS — R23.8 SKIN IRRITATION: Primary | ICD-10-CM

## 2023-06-23 PROCEDURE — 99213 OFFICE O/P EST LOW 20 MIN: CPT | Performed by: FAMILY MEDICINE

## 2023-06-23 PROCEDURE — 3008F BODY MASS INDEX DOCD: CPT | Performed by: FAMILY MEDICINE

## 2023-06-23 PROCEDURE — 3074F SYST BP LT 130 MM HG: CPT | Performed by: FAMILY MEDICINE

## 2023-06-23 PROCEDURE — 3078F DIAST BP <80 MM HG: CPT | Performed by: FAMILY MEDICINE

## 2023-08-28 ENCOUNTER — OFFICE VISIT (OUTPATIENT)
Dept: FAMILY MEDICINE CLINIC | Facility: CLINIC | Age: 68
End: 2023-08-28
Payer: MEDICARE

## 2023-08-28 VITALS
SYSTOLIC BLOOD PRESSURE: 112 MMHG | DIASTOLIC BLOOD PRESSURE: 70 MMHG | HEART RATE: 77 BPM | OXYGEN SATURATION: 99 % | HEIGHT: 63.5 IN | BODY MASS INDEX: 34.12 KG/M2 | WEIGHT: 195 LBS | RESPIRATION RATE: 16 BRPM

## 2023-08-28 DIAGNOSIS — Z71.85 VACCINE COUNSELING: ICD-10-CM

## 2023-08-28 DIAGNOSIS — Z12.31 ENCOUNTER FOR SCREENING MAMMOGRAM FOR MALIGNANT NEOPLASM OF BREAST: ICD-10-CM

## 2023-08-28 DIAGNOSIS — E55.9 VITAMIN D DEFICIENCY: ICD-10-CM

## 2023-08-28 DIAGNOSIS — E04.2 MULTIPLE THYROID NODULES: ICD-10-CM

## 2023-08-28 DIAGNOSIS — E89.0 POSTOPERATIVE HYPOTHYROIDISM: ICD-10-CM

## 2023-08-28 DIAGNOSIS — I71.20 THORACIC AORTIC ANEURYSM WITHOUT RUPTURE, UNSPECIFIED PART (HCC): ICD-10-CM

## 2023-08-28 DIAGNOSIS — G47.10 HYPERSOMNOLENCE: ICD-10-CM

## 2023-08-28 DIAGNOSIS — Z79.899 MEDICATION MANAGEMENT: ICD-10-CM

## 2023-08-28 DIAGNOSIS — E66.9 OBESITY (BMI 30-39.9): ICD-10-CM

## 2023-08-28 DIAGNOSIS — E78.5 DYSLIPIDEMIA: ICD-10-CM

## 2023-08-28 DIAGNOSIS — F43.0 STRESS REACTION: ICD-10-CM

## 2023-08-28 DIAGNOSIS — Z13.820 SCREENING FOR OSTEOPOROSIS: ICD-10-CM

## 2023-08-28 DIAGNOSIS — E21.3 HYPERPARATHYROIDISM (HCC): ICD-10-CM

## 2023-08-28 DIAGNOSIS — R73.9 HYPERGLYCEMIA: ICD-10-CM

## 2023-08-28 DIAGNOSIS — G47.33 OSA (OBSTRUCTIVE SLEEP APNEA): ICD-10-CM

## 2023-08-28 DIAGNOSIS — I10 ESSENTIAL HYPERTENSION: Primary | ICD-10-CM

## 2023-08-28 PROBLEM — R93.1 ABNORMAL CT SCAN, HEART: Status: ACTIVE | Noted: 2022-10-05

## 2023-08-28 RX ORDER — KETOCONAZOLE 20 MG/ML
10 SHAMPOO TOPICAL
COMMUNITY
Start: 2023-08-01

## 2023-08-28 RX ORDER — CLINDAMYCIN PHOSPHATE 11.9 MG/ML
1 SOLUTION TOPICAL DAILY
COMMUNITY
Start: 2023-07-31

## 2023-09-01 DIAGNOSIS — I10 ESSENTIAL HYPERTENSION: ICD-10-CM

## 2023-09-01 RX ORDER — VALSARTAN AND HYDROCHLOROTHIAZIDE 80; 12.5 MG/1; MG/1
1 TABLET, FILM COATED ORAL DAILY
Qty: 100 TABLET | Refills: 0 | Status: SHIPPED | OUTPATIENT
Start: 2023-09-01 | End: 2024-08-26

## 2023-09-20 ENCOUNTER — PATIENT MESSAGE (OUTPATIENT)
Dept: FAMILY MEDICINE CLINIC | Facility: CLINIC | Age: 68
End: 2023-09-20

## 2023-09-20 NOTE — TELEPHONE ENCOUNTER
From: Dali Nelson  To: Ashley Wong  Sent: 9/20/2023 2:36 PM CDT  Subject: Recent COVID Vaccine    Please add to my immunization records my recent COVID vaccine (booster):  COMIRNATY 12+ (COVID) 23-24 SDV  on 9/19/2023  Attached is photo of receipt from [employer].   Thank you,  Barbie Zarate

## 2023-10-07 ENCOUNTER — OFFICE VISIT (OUTPATIENT)
Dept: FAMILY MEDICINE CLINIC | Facility: CLINIC | Age: 68
End: 2023-10-07
Payer: MEDICARE

## 2023-10-07 VITALS
HEART RATE: 75 BPM | DIASTOLIC BLOOD PRESSURE: 82 MMHG | SYSTOLIC BLOOD PRESSURE: 112 MMHG | HEIGHT: 63 IN | OXYGEN SATURATION: 97 % | BODY MASS INDEX: 35.02 KG/M2 | RESPIRATION RATE: 20 BRPM | WEIGHT: 197.63 LBS | TEMPERATURE: 98 F

## 2023-10-07 DIAGNOSIS — J30.2 SEASONAL ALLERGIES: Primary | ICD-10-CM

## 2023-10-07 DIAGNOSIS — H69.92 DYSFUNCTION OF LEFT EUSTACHIAN TUBE: ICD-10-CM

## 2023-10-07 PROCEDURE — 99213 OFFICE O/P EST LOW 20 MIN: CPT | Performed by: NURSE PRACTITIONER

## 2023-10-11 ENCOUNTER — TELEPHONE (OUTPATIENT)
Dept: FAMILY MEDICINE CLINIC | Facility: CLINIC | Age: 68
End: 2023-10-11

## 2023-10-11 ENCOUNTER — OFFICE VISIT (OUTPATIENT)
Dept: FAMILY MEDICINE CLINIC | Facility: CLINIC | Age: 68
End: 2023-10-11
Payer: MEDICARE

## 2023-10-11 VITALS
BODY MASS INDEX: 35.26 KG/M2 | HEIGHT: 63 IN | OXYGEN SATURATION: 97 % | RESPIRATION RATE: 18 BRPM | TEMPERATURE: 98 F | HEART RATE: 75 BPM | SYSTOLIC BLOOD PRESSURE: 118 MMHG | WEIGHT: 199 LBS | DIASTOLIC BLOOD PRESSURE: 80 MMHG

## 2023-10-11 DIAGNOSIS — J18.9 PULMONARY INFECTION: Primary | ICD-10-CM

## 2023-10-11 DIAGNOSIS — R09.82 POSTNASAL DRIP: ICD-10-CM

## 2023-10-11 PROCEDURE — 3079F DIAST BP 80-89 MM HG: CPT | Performed by: STUDENT IN AN ORGANIZED HEALTH CARE EDUCATION/TRAINING PROGRAM

## 2023-10-11 PROCEDURE — 3008F BODY MASS INDEX DOCD: CPT | Performed by: STUDENT IN AN ORGANIZED HEALTH CARE EDUCATION/TRAINING PROGRAM

## 2023-10-11 PROCEDURE — 1160F RVW MEDS BY RX/DR IN RCRD: CPT | Performed by: STUDENT IN AN ORGANIZED HEALTH CARE EDUCATION/TRAINING PROGRAM

## 2023-10-11 PROCEDURE — 1159F MED LIST DOCD IN RCRD: CPT | Performed by: STUDENT IN AN ORGANIZED HEALTH CARE EDUCATION/TRAINING PROGRAM

## 2023-10-11 PROCEDURE — 99213 OFFICE O/P EST LOW 20 MIN: CPT | Performed by: STUDENT IN AN ORGANIZED HEALTH CARE EDUCATION/TRAINING PROGRAM

## 2023-10-11 PROCEDURE — 3074F SYST BP LT 130 MM HG: CPT | Performed by: STUDENT IN AN ORGANIZED HEALTH CARE EDUCATION/TRAINING PROGRAM

## 2023-10-11 PROCEDURE — 1170F FXNL STATUS ASSESSED: CPT | Performed by: STUDENT IN AN ORGANIZED HEALTH CARE EDUCATION/TRAINING PROGRAM

## 2023-10-11 RX ORDER — AZITHROMYCIN 250 MG/1
TABLET, FILM COATED ORAL
Qty: 6 TABLET | Refills: 0 | Status: SHIPPED | OUTPATIENT
Start: 2023-10-11 | End: 2023-10-15

## 2023-10-11 RX ORDER — AZELASTINE 1 MG/ML
2 SPRAY, METERED NASAL NIGHTLY PRN
Qty: 30 ML | Refills: 0 | Status: SHIPPED | OUTPATIENT
Start: 2023-10-11

## 2023-10-11 NOTE — TELEPHONE ENCOUNTER
Spoke to patient. Tested herself for covid and resulted negative. Taking cough Rx, cough is loose, and last Friday was bringing up thick green. Less productive, coughs to point of gagging. +congestion too. No H/A, or fever. No ingrid SOB. Saw PA at [facility] on Sat. and she said allergies. Sx's x 10 days now.

## 2023-10-11 NOTE — TELEPHONE ENCOUNTER
Pt sent a schedule request for the following:    \"Have relentless cough On 7th, walk-in-clinic = allergies Little change & sleep since with OTC meds\"

## 2023-11-27 ENCOUNTER — PATIENT MESSAGE (OUTPATIENT)
Dept: FAMILY MEDICINE CLINIC | Facility: CLINIC | Age: 68
End: 2023-11-27

## 2023-11-27 NOTE — TELEPHONE ENCOUNTER
From: Rody Berger  To: Ashley Wong  Sent: 11/27/2023 12:05 PM CST  Subject: Blood Test Ordered    I've scheduled the blood tests ordered at my last visit: Comp. Metabolic Panel, Hemoglobin A1C, and Lipid Panel.  I just want to confirm that I need to fast for these tests and how long should I fast?   Thanks,   Oc Gamino

## 2023-12-05 LAB
ALBUMIN/GLOBULIN RATIO: 1.9 (CALC) (ref 1–2.5)
ALBUMIN: 4.2 G/DL (ref 3.6–5.1)
ALKALINE PHOSPHATASE: 58 U/L (ref 37–153)
ALT: 16 U/L (ref 6–29)
AST: 17 U/L (ref 10–35)
BILIRUBIN, TOTAL: 0.4 MG/DL (ref 0.2–1.2)
BUN: 12 MG/DL (ref 7–25)
CALCIUM: 10 MG/DL (ref 8.6–10.4)
CARBON DIOXIDE: 30 MMOL/L (ref 20–32)
CHLORIDE: 104 MMOL/L (ref 98–110)
CHOL/HDLC RATIO: 2.8 (CALC)
CHOLESTEROL, TOTAL: 178 MG/DL
CREATININE: 0.75 MG/DL (ref 0.5–1.05)
EGFR: 88 ML/MIN/1.73M2
GLOBULIN: 2.2 G/DL (CALC) (ref 1.9–3.7)
GLUCOSE: 84 MG/DL (ref 65–99)
HDL CHOLESTEROL: 63 MG/DL
HEMOGLOBIN A1C: 5.3 % OF TOTAL HGB
LDL-CHOLESTEROL: 94 MG/DL (CALC)
NON-HDL CHOLESTEROL: 115 MG/DL (CALC)
POTASSIUM: 4.4 MMOL/L (ref 3.5–5.3)
PROTEIN, TOTAL: 6.4 G/DL (ref 6.1–8.1)
SODIUM: 140 MMOL/L (ref 135–146)
TRIGLYCERIDES: 114 MG/DL

## 2024-01-09 ENCOUNTER — OFFICE VISIT (OUTPATIENT)
Dept: FAMILY MEDICINE CLINIC | Facility: CLINIC | Age: 69
End: 2024-01-09
Payer: MEDICARE

## 2024-01-09 VITALS
OXYGEN SATURATION: 97 % | BODY MASS INDEX: 35.44 KG/M2 | HEIGHT: 63 IN | RESPIRATION RATE: 16 BRPM | DIASTOLIC BLOOD PRESSURE: 70 MMHG | WEIGHT: 200 LBS | SYSTOLIC BLOOD PRESSURE: 118 MMHG | HEART RATE: 65 BPM

## 2024-01-09 DIAGNOSIS — K21.00 GASTROESOPHAGEAL REFLUX DISEASE WITH ESOPHAGITIS WITHOUT HEMORRHAGE: ICD-10-CM

## 2024-01-09 DIAGNOSIS — I71.20 THORACIC AORTIC ANEURYSM WITHOUT RUPTURE, UNSPECIFIED PART (HCC): ICD-10-CM

## 2024-01-09 DIAGNOSIS — E66.9 OBESITY (BMI 30-39.9): ICD-10-CM

## 2024-01-09 DIAGNOSIS — F32.1 CURRENT MODERATE EPISODE OF MAJOR DEPRESSIVE DISORDER WITHOUT PRIOR EPISODE (HCC): ICD-10-CM

## 2024-01-09 DIAGNOSIS — G47.33 OSA (OBSTRUCTIVE SLEEP APNEA): ICD-10-CM

## 2024-01-09 DIAGNOSIS — D32.0 BENIGN NEOPLASM OF CEREBRAL MENINGES (HCC): ICD-10-CM

## 2024-01-09 DIAGNOSIS — D35.1 PARATHYROID ADENOMA: ICD-10-CM

## 2024-01-09 DIAGNOSIS — E55.9 VITAMIN D DEFICIENCY: ICD-10-CM

## 2024-01-09 DIAGNOSIS — Z79.899 MEDICATION MANAGEMENT: ICD-10-CM

## 2024-01-09 DIAGNOSIS — E04.2 MULTIPLE THYROID NODULES: ICD-10-CM

## 2024-01-09 DIAGNOSIS — I10 ESSENTIAL HYPERTENSION: ICD-10-CM

## 2024-01-09 DIAGNOSIS — E89.0 POSTOPERATIVE HYPOTHYROIDISM: ICD-10-CM

## 2024-01-09 DIAGNOSIS — E78.5 DYSLIPIDEMIA: ICD-10-CM

## 2024-01-09 DIAGNOSIS — Z71.85 VACCINE COUNSELING: ICD-10-CM

## 2024-01-09 DIAGNOSIS — R05.3 CHRONIC COUGH: Primary | ICD-10-CM

## 2024-01-09 DIAGNOSIS — E21.3 HYPERPARATHYROIDISM (HCC): ICD-10-CM

## 2024-01-09 DIAGNOSIS — R73.9 HYPERGLYCEMIA: ICD-10-CM

## 2024-01-09 PROCEDURE — 99214 OFFICE O/P EST MOD 30 MIN: CPT | Performed by: FAMILY MEDICINE

## 2024-01-09 PROCEDURE — 1170F FXNL STATUS ASSESSED: CPT | Performed by: FAMILY MEDICINE

## 2024-01-09 PROCEDURE — 1160F RVW MEDS BY RX/DR IN RCRD: CPT | Performed by: FAMILY MEDICINE

## 2024-01-09 PROCEDURE — 99499 UNLISTED E&M SERVICE: CPT | Performed by: FAMILY MEDICINE

## 2024-01-09 PROCEDURE — 1159F MED LIST DOCD IN RCRD: CPT | Performed by: FAMILY MEDICINE

## 2024-01-09 PROCEDURE — 3008F BODY MASS INDEX DOCD: CPT | Performed by: FAMILY MEDICINE

## 2024-01-09 PROCEDURE — 3078F DIAST BP <80 MM HG: CPT | Performed by: FAMILY MEDICINE

## 2024-01-09 PROCEDURE — 3074F SYST BP LT 130 MM HG: CPT | Performed by: FAMILY MEDICINE

## 2024-01-09 RX ORDER — OMEPRAZOLE 40 MG/1
40 CAPSULE, DELAYED RELEASE ORAL DAILY
Qty: 30 CAPSULE | Refills: 2 | Status: SHIPPED | OUTPATIENT
Start: 2024-01-09 | End: 2025-01-03

## 2024-01-09 NOTE — PROGRESS NOTES
Steffanie Nelson is a 68 year old female.  HPI:   PT. Is here for a med check.  HTN -- stable on valsartan/HCTZ  Hypothyroid -- stable on levothyroxine  Vitamin D def -- stable on vitamin D   Seeing Dr. Slater for her thyroid and parathyroid.  She had labs done in 12/2023  She is tired a lot and napping more than she usually dose.  Has LANDY but never used CPAP.  Pt. Has plantar fascitis in right foot -- doing well  Obese --  stable  Has had 2 crazy stressful years but is feeling better after speaking with Jailene and trying to take on what she can manage.  Pt. Complains of a cough notable 2 times a day and it is a coughing fir since having COVID in Fall of 2022.  Meds reviewed.      VALSARTAN-HYDROCHLOROTHIAZIDE 80-12.5 MG Oral Tab, Take 1 tablet by mouth daily., Disp: 30 tablet, Rfl: 0  levothyroxine 50 MCG Oral Tab, 50 mcg daily., Disp: , Rfl:   Ergocalciferol (VITAMIN D CAPS 90929 IU OR), 50,000 Units once a week., Disp: , Rfl:   Cholecalciferol (VITAMIN D) 25 MCG (1000 UT) Oral Tab, Take 2,000 Int'l Units by mouth daily., Disp: , Rfl:   Fiber-Vitamins-Minerals ([place] DAILY CARE FIBER GOOD) Oral Chew Tab, Chew 2 tablets by mouth daily., Disp: , Rfl:   Fluticasone Propionate 50 MCG/ACT Nasal Suspension, 1 spray by Each Nare route daily as needed.  , Disp: , Rfl:   Doxylamine Succinate, Sleep, (UNISOM OR), Take 0.5 tablets by mouth as needed.  , Disp: , Rfl:     No current facility-administered medications for this visit.     Allergies   Allergen Reactions    Seasonal Runny nose      Past Medical History:   Diagnosis Date    Depression     Endocrine disorder     H/O mammogram 3/10/2011    Pneumonia, organism unspecified(486)     Routine gynecological examination 11/24/2010    Sinusitis       Social History:  Social History     Socioeconomic History    Marital status:    Tobacco Use    Smoking status: Never    Smokeless tobacco: Never   Vaping Use    Vaping Use: Never used   Substance and Sexual  Activity    Alcohol use: Yes    Drug use: No   Other Topics Concern    Caffeine Concern Yes     Comment: 1 daily     Exercise Yes     Comment: WALKING & aerobics class 2 x week        Results for orders placed or performed in visit on 08/28/23   Lipid Panel   Result Value Ref Range    CHOLESTEROL, TOTAL 178 <200 mg/dL    HDL CHOLESTEROL 63 > OR = 50 mg/dL    TRIGLYCERIDES 114 <150 mg/dL    LDL-CHOLESTEROL 94 mg/dL (calc)    CHOL/HDLC RATIO 2.8 <5.0 (calc)    NON-HDL CHOLESTEROL 115 <130 mg/dL (calc)   Hemoglobin A1C   Result Value Ref Range    HEMOGLOBIN A1c 5.3 <5.7 % of total Hgb   Comp Metabolic Panel (14)   Result Value Ref Range    GLUCOSE 84 65 - 99 mg/dL    UREA NITROGEN (BUN) 12 7 - 25 mg/dL    CREATININE 0.75 0.50 - 1.05 mg/dL    EGFR 88 > OR = 60 mL/min/1.73m2    BUN/CREATININE RATIO SEE NOTE: 6 - 22 (calc)    SODIUM 140 135 - 146 mmol/L    POTASSIUM 4.4 3.5 - 5.3 mmol/L    CHLORIDE 104 98 - 110 mmol/L    CARBON DIOXIDE 30 20 - 32 mmol/L    CALCIUM 10.0 8.6 - 10.4 mg/dL    PROTEIN, TOTAL 6.4 6.1 - 8.1 g/dL    ALBUMIN 4.2 3.6 - 5.1 g/dL    GLOBULIN 2.2 1.9 - 3.7 g/dL (calc)    ALBUMIN/GLOBULIN RATIO 1.9 1.0 - 2.5 (calc)    BILIRUBIN, TOTAL 0.4 0.2 - 1.2 mg/dL    ALKALINE PHOSPHATASE 58 37 - 153 U/L    AST 17 10 - 35 U/L    ALT 16 6 - 29 U/L       REVIEW OF SYSTEMS:   GENERAL: feels well otherwise  SKIN: denies any unusual skin lesions  LUNGS: denies shortness of breath with exertion  CARDIOVASCULAR: denies chest pain on exertion  GI: denies abdominal pain,denies heartburn  MUSCULOSKELETAL: denies back pain  EXTREMITIES:  No pain or numbness    EXAM:   /70 (BP Location: Left arm, Patient Position: Sitting, Cuff Size: adult)   Pulse 65   Resp 16   Ht 5' 3\" (1.6 m)   Wt 200 lb (90.7 kg)   SpO2 97%   BMI 35.43 kg/m²   GENERAL: well developed, well nourished,in no apparent distress  SKIN: no rashes,no suspicious lesions  HEENT: atraumatic, normocephalic  NECK: supple,no adenopathy,no bruits; right  thyroid removed; left with thyroid nodules  LUNGS: clear to auscultation  CARDIO: RRR without murmur  GI: soft, good BS's; no HSM  EXTREMITIES: no cyanosis, clubbing or edema    ASSESSMENT AND PLAN:     Encounter Diagnoses   Name Primary?    Chronic cough Yes    Essential hypertension     Current moderate episode of major depressive disorder without prior episode (HCC)     Thoracic aortic aneurysm without rupture, unspecified part (HCC)     Hyperparathyroidism (HCC)     Benign neoplasm of cerebral meninges (HCC)     Hyperglycemia     Postoperative hypothyroidism     Dyslipidemia     Vitamin D deficiency     LANDY (obstructive sleep apnea)     Multiple thyroid nodules     Obesity (BMI 30-39.9)     Gastroesophageal reflux disease with esophagitis without hemorrhage     Parathyroid adenoma     Medication management     Vaccine counseling        No orders of the defined types were placed in this encounter.      Meds & Refills for this Visit:  Requested Prescriptions     Signed Prescriptions Disp Refills    Omeprazole 40 MG Oral Capsule Delayed Release 30 capsule 2     Sig: Take 1 capsule (40 mg total) by mouth daily.       Imaging & Consults:  XR CHEST PA + LAT CHEST (HUB=16903)     Chronic cough -- will try omeprazole; monitor cough; ENT did not have any concern and advised it was due to age; advised CXR  HTN -- stable on valsartan/HCTZ  Dyslipidemia --  stable  Vitamin D def -- per Dr. Slater  Hypothyroid/hyperparathyroid /thyroid nodules-- per Dr. Slater  Obese -- focus on diet and exercise  LANDY/hypersomnolence -- stable; advised sleep study due to worsening fatigue  Stress reaction -- saw Jailene and stopped seeing her in 11/2023 and doing well  Thoracic aortic aneurysm -- stable  Labs due in 12/2024.  Mammo and Dexa due 2/2024.- scheduled.    Dr. Gama -- 2/2024 and will do echo  Colonoscopy due and will schedule with Dr. Lucas    The patient indicates understanding of these issues and agrees to the plan.  Return in  about 3 months (around 4/9/2024) for MA supervisit.

## 2024-03-18 ENCOUNTER — HOSPITAL ENCOUNTER (OUTPATIENT)
Dept: BONE DENSITY | Age: 69
Discharge: HOME | End: 2024-03-18
Attending: FAMILY MEDICINE
Payer: MEDICARE

## 2024-03-18 ENCOUNTER — HOSPITAL ENCOUNTER (OUTPATIENT)
Dept: GENERAL RADIOLOGY | Age: 69
Discharge: HOME | End: 2024-03-18
Attending: FAMILY MEDICINE
Payer: MEDICARE

## 2024-03-18 ENCOUNTER — HOSPITAL ENCOUNTER (OUTPATIENT)
Dept: MAMMOGRAPHY | Age: 69
Discharge: HOME | End: 2024-03-18
Attending: FAMILY MEDICINE
Payer: MEDICARE

## 2024-03-18 DIAGNOSIS — Z13.820 SCREENING FOR OSTEOPOROSIS: ICD-10-CM

## 2024-03-18 DIAGNOSIS — Z12.31 ENCOUNTER FOR SCREENING MAMMOGRAM FOR MALIGNANT NEOPLASM OF BREAST: ICD-10-CM

## 2024-03-18 DIAGNOSIS — R05.3 CHRONIC COUGH: ICD-10-CM

## 2024-03-18 PROCEDURE — 77080 DXA BONE DENSITY AXIAL: CPT | Performed by: FAMILY MEDICINE

## 2024-03-18 PROCEDURE — 77067 SCR MAMMO BI INCL CAD: CPT | Performed by: FAMILY MEDICINE

## 2024-03-18 PROCEDURE — 71046 X-RAY EXAM CHEST 2 VIEWS: CPT | Performed by: FAMILY MEDICINE

## 2024-03-18 PROCEDURE — 77063 BREAST TOMOSYNTHESIS BI: CPT | Performed by: FAMILY MEDICINE

## 2024-04-03 DIAGNOSIS — I10 ESSENTIAL HYPERTENSION: ICD-10-CM

## 2024-04-03 RX ORDER — VALSARTAN AND HYDROCHLOROTHIAZIDE 80; 12.5 MG/1; MG/1
1 TABLET, FILM COATED ORAL DAILY
Qty: 100 TABLET | Refills: 1 | Status: SHIPPED | OUTPATIENT
Start: 2024-04-03

## 2024-04-03 NOTE — TELEPHONE ENCOUNTER
Last office visit: 1/9/2024   Protocol: pass  Requested medication(s) are due for refill today: yes  Requested medication(s) are on the active medication list same strength, form, dose/ sig: yes  Requested medication(s) are managed by provider: yes  Patient has already received a courtsey refill: no     NOV: 4/10/2024

## 2024-04-10 ENCOUNTER — OFFICE VISIT (OUTPATIENT)
Dept: FAMILY MEDICINE CLINIC | Facility: CLINIC | Age: 69
End: 2024-04-10
Payer: MEDICARE

## 2024-04-10 VITALS
BODY MASS INDEX: 35.68 KG/M2 | OXYGEN SATURATION: 98 % | HEIGHT: 63.15 IN | RESPIRATION RATE: 16 BRPM | WEIGHT: 201.38 LBS | DIASTOLIC BLOOD PRESSURE: 72 MMHG | SYSTOLIC BLOOD PRESSURE: 120 MMHG | HEART RATE: 74 BPM

## 2024-04-10 DIAGNOSIS — E89.0 POSTOPERATIVE HYPOTHYROIDISM: ICD-10-CM

## 2024-04-10 DIAGNOSIS — D35.1 PARATHYROID ADENOMA: ICD-10-CM

## 2024-04-10 DIAGNOSIS — E66.01 CLASS 2 SEVERE OBESITY DUE TO EXCESS CALORIES WITH SERIOUS COMORBIDITY AND BODY MASS INDEX (BMI) OF 35.0 TO 35.9 IN ADULT (HCC): ICD-10-CM

## 2024-04-10 DIAGNOSIS — R73.9 HYPERGLYCEMIA: ICD-10-CM

## 2024-04-10 DIAGNOSIS — K21.00 GASTROESOPHAGEAL REFLUX DISEASE WITH ESOPHAGITIS WITHOUT HEMORRHAGE: ICD-10-CM

## 2024-04-10 DIAGNOSIS — G47.33 OSA (OBSTRUCTIVE SLEEP APNEA): ICD-10-CM

## 2024-04-10 DIAGNOSIS — Z71.85 VACCINE COUNSELING: ICD-10-CM

## 2024-04-10 DIAGNOSIS — I10 ESSENTIAL HYPERTENSION: ICD-10-CM

## 2024-04-10 DIAGNOSIS — D32.0 BENIGN NEOPLASM OF CEREBRAL MENINGES (HCC): ICD-10-CM

## 2024-04-10 DIAGNOSIS — Z00.00 ENCOUNTER FOR ANNUAL HEALTH EXAMINATION: Primary | ICD-10-CM

## 2024-04-10 DIAGNOSIS — E78.5 DYSLIPIDEMIA: ICD-10-CM

## 2024-04-10 DIAGNOSIS — E21.3 HYPERPARATHYROIDISM (HCC): ICD-10-CM

## 2024-04-10 DIAGNOSIS — R05.3 CHRONIC COUGH: ICD-10-CM

## 2024-04-10 DIAGNOSIS — Z79.899 MEDICATION MANAGEMENT: ICD-10-CM

## 2024-04-10 DIAGNOSIS — Z12.11 SCREENING FOR COLON CANCER: ICD-10-CM

## 2024-04-10 DIAGNOSIS — E55.9 VITAMIN D DEFICIENCY: ICD-10-CM

## 2024-04-10 DIAGNOSIS — E04.2 MULTIPLE THYROID NODULES: ICD-10-CM

## 2024-04-10 DIAGNOSIS — I71.20 THORACIC AORTIC ANEURYSM WITHOUT RUPTURE, UNSPECIFIED PART (HCC): ICD-10-CM

## 2024-04-10 DIAGNOSIS — F32.1 CURRENT MODERATE EPISODE OF MAJOR DEPRESSIVE DISORDER WITHOUT PRIOR EPISODE (HCC): ICD-10-CM

## 2024-04-10 NOTE — PATIENT INSTRUCTIONS
Steffanie Nelson's SCREENING SCHEDULE   Tests on this list are recommended by your physician but may not be covered, or covered at this frequency, by your insurer.   Please check with your insurance carrier before scheduling to verify coverage.   PREVENTATIVE SERVICES FREQUENCY &  COVERAGE DETAILS LAST COMPLETION DATE   Diabetes Screening    Fasting Blood Sugar /  Glucose    One screening every 12 months if never tested or if previously tested but not diagnosed with pre-diabetes   One screening every 6 months if diagnosed with pre-diabetes Lab Results   Component Value Date    GLU 84 12/04/2023        Cardiovascular Disease Screening    Lipid Panel  Cholesterol  Lipoprotein (HDL)  Triglycerides Covered every 5 years for all Medicare beneficiaries without apparent signs or symptoms of cardiovascular disease Lab Results   Component Value Date    CHOLEST 178 12/04/2023    HDL 63 12/04/2023    LDL 94 12/04/2023    TRIG 114 12/04/2023         Electrocardiogram (EKG)   Covered if needed at Welcome to Medicare, and non-screening if indicated for medical reasons 07/18/2013      Ultrasound Screening for Abdominal Aortic Aneurysm (AAA) Covered once in a lifetime for one of the following risk factors   • Men who are 65-75 years old and have ever smoked   • Anyone with a family history -     Colorectal Cancer Screening  Covered for ages 50-85; only need ONE of the following:    Colonoscopy   Covered every 10 years    Covered every 2 years if patient is at high risk or previous colonoscopy was abnormal -    Health Maintenance   Topic Date Due   • Colorectal Cancer Screening  07/12/2023       Flexible Sigmoidoscopy   Covered every 4 years -    Fecal Occult Blood Test Covered annually -   Bone Density Screening    Bone density screening    Covered every 2 years after age 65 if diagnosed with risk of osteoporosis or estrogen deficiency.    Covered yearly for long-term glucocorticoid medication use (Steroids) Last Dexa  Scan:    XR DEXA BONE DENSITOMETRY (CPT=77080) 03/18/2024      No recommendations at this time   Pap and Pelvic    Pap   Covered every 2 years for women at normal risk; Annually if at high risk 02/18/2022  No recommendations at this time    Chlamydia Annually if high risk -  No recommendations at this time   Screening Mammogram    Mammogram     Recommend annually for all female patients aged 40 and older    One baseline mammogram covered for patients aged 35-39 03/18/2024    Health Maintenance   Topic Date Due   • Mammogram  03/18/2025       Immunizations    Influenza Covered once per flu season  Please get every year 10/30/2023  No recommendations at this time    Pneumococcal Each vaccine ([id number] & [id number]) covered once after 65 Prevnar 13: -    [id number]: -     No recommendations at this time    Hepatitis B One screening covered for patients with certain risk factors   11/07/2016  No recommendations at this time    Tetanus Toxoid Not covered by Medicare Part B unless medically necessary (cut with metal); may be covered with your pharmacy prescription benefits -    Tetanus, Diptheria and Pertusis TD and TDaP Not covered by Medicare Part B -  No recommendations at this time    Zoster Not covered by Medicare Part B; may be covered with your pharmacy  prescription benefits -  No recommendations at this time     Annual Monitoring of Persistent Medications (ACE/ARB, digoxin diuretics, anticonvulsants)    Potassium Annually Lab Results   Component Value Date    K 4.4 12/04/2023         Creatinine   Annually Lab Results   Component Value Date    CREATSERUM 0.75 12/04/2023         BUN Annually Lab Results   Component Value Date    BUN 12 12/04/2023       Drug Serum Conc Annually No results found for: \"DIGOXIN\", \"DIG\", \"VALP\"

## 2024-04-10 NOTE — PROGRESS NOTES
Subjective:   Steffanie Nelson is a 68 year old female who presents for a MA (Medicare Advantage) Supervisit (Once per calendar year) and scheduled follow up of multiple significant but stable problems.   PT. Is here for MA supervisit and med check.    HTN -- stable on valsartan/HCTZ  Hypothyroid -- stable on levothyroxine  Vitamin D def -- stable on vitamin D   Seeing Dr. Slater for her thyroid and parathyroid.  Things are stable.    Has LANDY but never used CPAP.  Seen derm Dr. Lee -- folliculitis -- solution and shampoo.  Obese --  up 1 lb  Has not tried omeprazole yet to see if it will help the cough.  Due for sleep study.  Struggling with weight control.    Mood doing good.  Eye exam -- 4/2023  Dental exam -- 10/2023    Had COVID in Feb and then caught what grand kids had for weeks.  Cough and congestion and not starting to feel better.  Saw Dr. Gama in 2/2024 -- echo stable  Meds reviewed.    History/Other:   Fall Risk Assessment:   She has been screened for Falls and is low risk.      Cognitive Assessment:   She had a completely normal cognitive assessment - see flowsheet entries       Functional Ability/Status:   Steffanie Nelson has a completely normal functional assessment. See flowsheet for details.      Depression Screening (PHQ-2/PHQ-9): PHQ-2 SCORE: 0  , done 4/9/2024   If you checked off any problems, how difficult have these problems made it for you to do your work, take care of things at home, or get along with other people?: Not difficult at all    Last [place] Suicide Screening on 4/10/2024 was No Risk.     5 minutes spent screening and counseling for depression    Advanced Directives:   She does NOT have a Living Will. [Do you have a living will?: No]  She does NOT have a Power of  for Health Care. [Do you have a healthcare power of ?: No]  Discussed Advance Care Planning with patient (and family/surrogate if present). Standard forms made available to patient in  After Visit Summary.      Patient Active Problem List   Diagnosis   • Obesity, unspecified   • Adhesive capsulitis of shoulder   • Other specified menopausal and postmenopausal disorder   • Benign neoplasm of cerebral meninges (HCC)   • Vaginitis and vulvovaginitis, unspecified   • Essential hypertension   • Depression   • Pure hyperglyceridemia   • Reflux esophagitis   • Hyperparathyroidism (HCC)   • Hypothyroidism   • Sepsis, unspecified   • Abdominal pain   • Dyslipidemia   • Vitamin D deficiency   • Thyroid dysfunction   • Thyroid nodule   • Laryngopharyngeal reflux   • Parathyroid adenoma   • LANDY (obstructive sleep apnea)   • Postoperative hypothyroidism   • Multiple thyroid nodules   • Thoracic aortic aneurysm without rupture, unspecified part ([facility])   • Abnormal CT scan, heart   • Current moderate episode of major depressive disorder without prior episode ([facility])   • Severe obesity (BMI 35.0-39.9) with comorbidity ([facility])     Allergies:  She is allergic to seasonal.    Current Medications:  Outpatient Medications Marked as Taking for the 4/10/24 encounter (Office Visit) with Ashley Wong DO   Medication Sig   • valsartan-hydroCHLOROthiazide 80-12.5 MG Oral Tab TAKE ONE TABLET BY MOUTH ONCE DAILY   • Omeprazole 40 MG Oral Capsule Delayed Release Take 1 capsule (40 mg total) by mouth daily.   • azelastine 0.1 % Nasal Solution 2 sprays by Nasal route nightly as needed (for sinus congestion).   • clindamycin 1 % External Solution Apply 1 Application topically daily. To affected areas of scalp   • ketoconazole 2 % External Shampoo Apply 10 mL topically twice a week.   • ergocalciferol 1.25 MG (45240 UT) Oral Cap Take 1 capsule (50,000 Units total) by mouth once a week.   • levothyroxine 50 MCG Oral Tab 1 tablet (50 mcg total) daily.   • Cholecalciferol (VITAMIN D) 25 MCG (1000 UT) Oral Tab Take 1,000 Int'l Units by mouth daily.   • Fiber-Vitamins-Minerals ([facility] FIBER GOOD) Oral Chew Tab Chew 2 tablets  by mouth daily.   • Fluticasone Propionate 50 MCG/ACT Nasal Suspension 1 spray by Each Nare route daily as needed.   • Doxylamine Succinate, Sleep, (UNISOM OR) Take 0.5 tablets by mouth as needed.         Medical History:  She  has a past medical history of Depression, Endocrine disorder, H/O mammogram (3/10/2011), Pneumonia, organism unspecified(486), Routine gynecological examination (11/24/2010), and Sinusitis.  Surgical History:  She  has a past surgical history that includes dual energy x-ray absorptiometry, body composition study, 1+ sites (11/12/2009); colonoscopy (2007); breast surgery procedure unlisted (2001); d & c (11/28/2011); other surgical history (1/12/2005); thyroid lobectomy,unilat (1/12/2005); vaginal hysterectomy, uterus >250 gms; w/removal, tube(s) &/or ovary(s) w/repair of enterocele (02/15/2013); cyst aspiration right (Right, 2003); cyst aspiration right (Right, 2007); colonoscopy (2013); and hysterectomy (2/2013).   Family History:  Her family history includes Breast Cancer (age of onset: 37) in her sister; alzheimer's disease in her father; angina pectoris in her mother; covid in her mother; stroke syndrome in her maternal grandmother and another family member.  Social History:  She  reports that she has never smoked. She has never used smokeless tobacco. She reports current alcohol use. She reports that she does not use drugs.    Tobacco:  She has never smoked tobacco.    CAGE Alcohol Screen:   CAGE screening score of 0 on 4/9/2024, showing low risk of alcohol abuse.      Patient Care Team:  Ashley Wong DO as PCP - General (Family Practice)    Review of Systems  GENERAL: feels well otherwise  SKIN: denies any unusual skin lesions  EYES: denies blurred vision or double vision  HEENT: denies nasal congestion, sinus pain or ST  LUNGS: denies shortness of breath with exertion  CARDIOVASCULAR: denies chest pain on exertion  GI: denies abdominal pain, denies heartburn  : denies dysuria,  vaginal discharge or itching, no complaint of urinary incontinence   MUSCULOSKELETAL: denies back pain  NEURO: denies headaches  PSYCHE: denies depression or anxiety  HEMATOLOGIC: denies hx of anemia  ENDOCRINE:  thyroid history  ALL/ASTHMA: denies hx of allergy or asthma    Objective:   Physical Exam  General Appearance:  Alert, cooperative, no distress, appears stated age   Head:  Normocephalic, without obvious abnormality, atraumatic   Eyes:  Conjunctiva/corneas clear, EOM's intact both eyes   Ears:  Normal TM's and external ear canals, both ears   Nose: Nares normal, septum midline,mucosa normal, no drainage or sinus tenderness   Throat: Lips, mucosa, and tongue normal; teeth and gums normal   Neck: Supple, symmetrical, trachea midline, no adenopathy;  thyroid: s/p right thyroidectomy; left nodules; no carotid bruit or JVD   Back:   Symmetric, no curvature, ROM normal, no CVA tenderness   Lungs:   Clear to auscultation bilaterally, respirations unlabored   Heart:  Regular rate and rhythm, S1 and S2 normal, no murmur, rub, or gallop   Abdomen:   Soft, non-tender, bowel sounds active all four quadrants,  no masses, no organomegaly   Pelvic: Deferred   Extremities: Extremities normal, atraumatic, no cyanosis or edema   Pulses: 2+ and symmetric   Skin: Skin color, texture, turgor normal, no rashes or lesions   Lymph nodes: Cervical, supraclavicular, and axillary nodes normal   Neurologic: Normal    and Breasts:  normal appearance, no masses or tenderness    /72 (BP Location: Left arm, Patient Position: Sitting, Cuff Size: adult)   Pulse 74   Resp 16   Ht 5' 3.15\" (1.604 m)   Wt 201 lb 6 oz (91.3 kg)   SpO2 98%   BMI 35.50 kg/m²  Estimated body mass index is 35.5 kg/m² as calculated from the following:    Height as of this encounter: 5' 3.15\" (1.604 m).    Weight as of this encounter: 201 lb 6 oz (91.3 kg).    Medicare Hearing Assessment:   Hearing Screening    Time taken: 4/10/2024  9:37 AM  Entry User:  Ruthie Olsen MA  Screening Method: Finger Rub  Finger Rub Result: Pass         Visual Acuity:   Right Eye Visual Acuity: Corrected Right Eye Chart Acuity: 20/20   Left Eye Visual Acuity: Corrected Left Eye Chart Acuity: 20/20   Both Eyes Visual Acuity: Corrected Both Eyes Chart Acuity: 20/20   Able To Tolerate Visual Acuity: Yes        Assessment & Plan:   Steffanie Nelson is a 68 year old female who presents for a Medicare Assessment.     1. Encounter for annual health examination (Primary)  2. Essential hypertension  -     CBC With Differential With Platelet; Future; Expected date: 04/10/2024  -     Urinalysis, Routine; Future; Expected date: 04/10/2024  -     Detailed, Mod Complex (40841)  3. Hyperparathyroidism (HCC)  -     Assay, Thyroid Stim Hormone; Future; Expected date: 04/10/2024  -     Free T4, (Free Thyroxine); Future; Expected date: 04/10/2024  -     Detailed, Mod Complex (88995)  4. Hyperglycemia  -     Comp Metabolic Panel (14); Future; Expected date: 04/10/2024  -     Hemoglobin A1C; Future; Expected date: 04/10/2024  -     Microalb/Creat Ratio, Random Urine; Future; Expected date: 04/10/2024  -     Detailed, Mod Complex (95413)  5. Dyslipidemia  -     Lipid Panel; Future; Expected date: 04/10/2024  -     Detailed, Mod Complex (47982)  6. Vitamin D deficiency  -     Vitamin D; Future; Expected date: 04/10/2024  -     Detailed, Mod Complex (92146)  7. Current moderate episode of major depressive disorder without prior episode (HCC)  -     Detailed, Mod Complex (66004)  8. Thoracic aortic aneurysm without rupture, unspecified part (HCC)  -     Detailed, Mod Complex (70614)  9. Benign neoplasm of cerebral meninges (HCC)  -     Detailed, Mod Complex (28391)  10. Postoperative hypothyroidism  -     Detailed, Mod Complex (64965)  11. LANDY (obstructive sleep apnea)  -     Detailed, Mod Complex (86507)  12. Class 2 severe obesity due to excess calories with serious comorbidity and body mass index  (BMI) of 35.0 to 35.9 in adult (HCC)  -     Detailed, Mod Complex (99214)  13. Chronic cough  -     Detailed, Mod Complex (99214)  -     Surgery Referral - In Network  14. Multiple thyroid nodules  -     Detailed, Mod Complex (99214)  15. Parathyroid adenoma  -     Detailed, Mod Complex (99214)  16. Gastroesophageal reflux disease with esophagitis without hemorrhage  -     Detailed, Mod Complex (99214)  17. Medication management  -     Detailed, Mod Complex (99214)  18. Vaccine counseling  -     Detailed, Mod Complex (99214)  19. Screening for colon cancer  -     Detailed, Mod Complex (99214)  -     Surgery Referral - In Network    The patient indicates understanding of these issues and agrees to the plan.  Reinforced healthy diet, lifestyle, and exercise.      1. Encounter for annual health examination  Done today.    2. Essential hypertension  Stable; CPM  - CBC W Differential W Platelet [E]; Future  - Urinalysis, Routine [E]; Future  - Detailed, Mod Complex (99214)    3. Hyperparathyroidism (HCC)  Stable; CPM  - TSH [E]; Future  - T4 FREE [E] (Free Thyroxine); Future  - Detailed, Mod Complex (99214)    4. Hyperglycemia  Stable; CPM  - Comp Metabolic Panel (14) [E]; Future  - Hemoglobin A1C [E]; Future  - Microalb/Creat Ratio, Random Urine [E]; Future  - Detailed, Mod Complex (99214)    5. Dyslipidemia  Stable; CPM  - Lipid Panel [E]; Future  - Detailed, Mod Complex (99214)    6. Vitamin D deficiency  Stable; CPM  - Vitamin D [E]; Future  - Detailed, Mod Complex (99214)    7. Current moderate episode of major depressive disorder without prior episode (HCC)  Stable; CPM  - Detailed, Mod Complex (99214)    8. Thoracic aortic aneurysm without rupture, unspecified part (HCC)  Stable; CPM  - Detailed, Mod Complex (99214)    9. Benign neoplasm of cerebral meninges (HCC)  Stable; CPM  - Detailed, Mod Complex (99214)    10. Postoperative hypothyroidism  Stable; CPM  - Detailed, Mod Complex (99214)    11. LANDY (obstructive  sleep apnea)  Stable; CPM  - Detailed, Mod Complex (01812)    12. Class 2 severe obesity due to excess calories with serious comorbidity and body mass index (BMI) of 35.0 to 35.9 in adult (HCC)  Will focus on being more active.  Deferred follow up in 3 months for her weight.     13. Chronic cough  Will try omeprazole and see Dr. Ames for poss. EGD and colonoscopy.  - Detailed, Mod Complex (48363)  - Surgery Referral - In Network    14. Multiple thyroid nodules  Stable on left thyroid lobe nodules.  - Detailed, Mod Complex (99214)    15. Parathyroid adenoma  Stable; CPM  - Detailed, Mod Complex (28994)    16. Gastroesophageal reflux disease with esophagitis without hemorrhage  Stable; CPM  - Detailed, Mod Complex (64469)    17. Medication management  Reviewed.  - Detailed, Mod Complex (99214)    18. Vaccine counseling  Reviewed.  - Detailed, Mod Complex (99214)    19. Screening for colon cancer  Advised Dr. Ames.  - Surgery Referral - In Network        Return in 6 months (on 10/10/2024).     Ashley Wong DO, 4/10/2024     Supplementary Documentation:   General Health:  In the past six months, have you lost more than 10 pounds without trying?: 2 - No  Has your appetite been poor?: No  Type of Diet: Balanced;Low Salt  How does the patient maintain a good energy level?: Daily Walks  How would you describe your daily physical activity?: Moderate  How would you describe your current health state?: Fair  How do you maintain positive mental well-being?: Social Interaction;Puzzles;Visiting Friends;Visiting Family  On a scale of 0 to 10, with 0 being no pain and 10 being severe pain, what is your pain level?: 0 - (None)  In the past six months, have you experienced urine leakage?: 1-Yes  At any time do you feel concerned for the safety/well-being of yourself and/or your children, in your home or elsewhere?: No  Have you had any immunizations at another office such as Influenza, Hepatitis B, Tetanus, or  Pneumococcal?: Yes       Steffanie Nelson's SCREENING SCHEDULE   Tests on this list are recommended by your physician but may not be covered, or covered at this frequency, by your insurer.   Please check with your insurance carrier before scheduling to verify coverage.   PREVENTATIVE SERVICES FREQUENCY &  COVERAGE DETAILS LAST COMPLETION DATE   Diabetes Screening    Fasting Blood Sugar /  Glucose    One screening every 12 months if never tested or if previously tested but not diagnosed with pre-diabetes   One screening every 6 months if diagnosed with pre-diabetes Lab Results   Component Value Date    GLU 84 12/04/2023        Cardiovascular Disease Screening    Lipid Panel  Cholesterol  Lipoprotein (HDL)  Triglycerides Covered every 5 years for all Medicare beneficiaries without apparent signs or symptoms of cardiovascular disease Lab Results   Component Value Date    CHOLEST 178 12/04/2023    HDL 63 12/04/2023    LDL 94 12/04/2023    TRIG 114 12/04/2023         Electrocardiogram (EKG)   Covered if needed at Welcome to Medicare, and non-screening if indicated for medical reasons 07/18/2013      Ultrasound Screening for Abdominal Aortic Aneurysm (AAA) Covered once in a lifetime for one of the following risk factors   • Men who are 65-75 years old and have ever smoked   • Anyone with a family history -     Colorectal Cancer Screening  Covered for ages 50-85; only need ONE of the following:    Colonoscopy   Covered every 10 years    Covered every 2 years if patient is at high risk or previous colonoscopy was abnormal -    Health Maintenance   Topic Date Due   • Colorectal Cancer Screening  07/12/2023       Flexible Sigmoidoscopy   Covered every 4 years -    Fecal Occult Blood Test Covered annually -   Bone Density Screening    Bone density screening    Covered every 2 years after age 65 if diagnosed with risk of osteoporosis or estrogen deficiency.    Covered yearly for long-term glucocorticoid medication use  (Steroids) Last Dexa Scan:    XR DEXA BONE DENSITOMETRY (CPT=77080) 03/18/2024      No recommendations at this time   Pap and Pelvic    Pap   Covered every 2 years for women at normal risk; Annually if at high risk 02/18/2022  No recommendations at this time    Chlamydia Annually if high risk -  No recommendations at this time   Screening Mammogram    Mammogram     Recommend annually for all female patients aged 40 and older    One baseline mammogram covered for patients aged 35-39 03/18/2024    Health Maintenance   Topic Date Due   • Mammogram  03/18/2025       Immunizations    Influenza Covered once per flu season  Please get every year 10/30/2023  No recommendations at this time    Pneumococcal Each vaccine ([id number] & [id number]) covered once after 65 Prevnar 13: -    [id number]: -     No recommendations at this time    Hepatitis B One screening covered for patients with certain risk factors   11/07/2016  No recommendations at this time    Tetanus Toxoid Not covered by Medicare Part B unless medically necessary (cut with metal); may be covered with your pharmacy prescription benefits -    Tetanus, Diptheria and Pertusis TD and TDaP Not covered by Medicare Part B -  No recommendations at this time    Zoster Not covered by Medicare Part B; may be covered with your pharmacy  prescription benefits -  No recommendations at this time     Annual Monitoring of Persistent Medications (ACE/ARB, digoxin diuretics, anticonvulsants)    Potassium Annually Lab Results   Component Value Date    K 4.4 12/04/2023         Creatinine   Annually Lab Results   Component Value Date    CREATSERUM 0.75 12/04/2023         BUN Annually Lab Results   Component Value Date    BUN 12 12/04/2023       Drug Serum Conc Annually No results found for: \"DIGOXIN\", \"DIG\", \"VALP\"

## 2024-05-06 ENCOUNTER — PATIENT MESSAGE (OUTPATIENT)
Dept: FAMILY MEDICINE CLINIC | Facility: CLINIC | Age: 69
End: 2024-05-06

## 2024-05-07 NOTE — TELEPHONE ENCOUNTER
From: Steffanie Nelson  To: Ashley Wong  Sent: 5/6/2024 3:20 PM CDT  Subject: Sleep Study    I've scheduled a sleep study for July 15, 2024. My insurance, [employer] requires authorization for diagnostic tests performed in healthcare setting which I assume includes a sleep study. A previous sleep study (2017) diagnosed supine sleep apnea, I continue to have trouble falling to sleep, and recently I woke up choking. As I understand it, the authorization process begins by you sending [employer] a request. Please let me know if your office starts the process or do I need to contact [facility] or someone else.  Thank you,  Steffanie

## 2024-05-07 NOTE — TELEPHONE ENCOUNTER
Detailed vm left for pt that sleep study status is authorized, call sleep center with further questions.

## 2024-07-15 ENCOUNTER — OFFICE VISIT (OUTPATIENT)
Dept: SLEEP CENTER | Age: 69
End: 2024-07-15
Attending: Other
Payer: MEDICARE

## 2024-07-15 DIAGNOSIS — G47.33 OSA (OBSTRUCTIVE SLEEP APNEA): ICD-10-CM

## 2024-07-15 DIAGNOSIS — G47.10 HYPERSOMNOLENCE: ICD-10-CM

## 2024-07-15 PROCEDURE — 95810 POLYSOM 6/> YRS 4/> PARAM: CPT

## 2024-07-18 ENCOUNTER — SLEEP STUDY (OUTPATIENT)
Facility: CLINIC | Age: 69
End: 2024-07-18
Payer: MEDICARE

## 2024-07-18 DIAGNOSIS — G47.33 OBSTRUCTIVE SLEEP APNEA SYNDROME: Primary | ICD-10-CM

## 2024-07-18 PROCEDURE — 95810 POLYSOM 6/> YRS 4/> PARAM: CPT | Performed by: OTHER

## 2024-08-22 ENCOUNTER — OFFICE VISIT (OUTPATIENT)
Dept: FAMILY MEDICINE CLINIC | Facility: CLINIC | Age: 69
End: 2024-08-22
Payer: MEDICARE

## 2024-08-22 VITALS
WEIGHT: 205 LBS | RESPIRATION RATE: 16 BRPM | BODY MASS INDEX: 36.32 KG/M2 | HEIGHT: 63.15 IN | SYSTOLIC BLOOD PRESSURE: 114 MMHG | OXYGEN SATURATION: 99 % | HEART RATE: 77 BPM | DIASTOLIC BLOOD PRESSURE: 70 MMHG

## 2024-08-22 DIAGNOSIS — N39.0 URINARY TRACT INFECTION WITH HEMATURIA, SITE UNSPECIFIED: Primary | ICD-10-CM

## 2024-08-22 DIAGNOSIS — R30.0 DYSURIA: ICD-10-CM

## 2024-08-22 DIAGNOSIS — R35.0 URINARY FREQUENCY: ICD-10-CM

## 2024-08-22 DIAGNOSIS — Z79.899 MEDICATION MANAGEMENT: ICD-10-CM

## 2024-08-22 DIAGNOSIS — R31.9 URINARY TRACT INFECTION WITH HEMATURIA, SITE UNSPECIFIED: Primary | ICD-10-CM

## 2024-08-22 LAB
BILIRUBIN: NEGATIVE
GLUCOSE (URINE DIPSTICK): NEGATIVE MG/DL
KETONES (URINE DIPSTICK): NEGATIVE MG/DL
MULTISTIX LOT#: ABNORMAL NUMERIC
NITRITE, URINE: NEGATIVE
PH, URINE: 6 (ref 4.5–8)
PROTEIN (URINE DIPSTICK): NEGATIVE MG/DL
SPECIFIC GRAVITY: 1.01 (ref 1–1.03)
URINE-COLOR: YELLOW
UROBILINOGEN,SEMI-QN: 0.2 MG/DL (ref 0–1.9)

## 2024-08-22 PROCEDURE — 87088 URINE BACTERIA CULTURE: CPT

## 2024-08-22 PROCEDURE — 81003 URINALYSIS AUTO W/O SCOPE: CPT

## 2024-08-22 PROCEDURE — 99215 OFFICE O/P EST HI 40 MIN: CPT

## 2024-08-22 PROCEDURE — 87186 SC STD MICRODIL/AGAR DIL: CPT

## 2024-08-22 PROCEDURE — 87086 URINE CULTURE/COLONY COUNT: CPT

## 2024-08-22 RX ORDER — NITROFURANTOIN 25; 75 MG/1; MG/1
100 CAPSULE ORAL 2 TIMES DAILY
Qty: 10 CAPSULE | Refills: 0 | Status: SHIPPED | OUTPATIENT
Start: 2024-08-22

## 2024-08-22 NOTE — PROGRESS NOTES
CHIEF COMPLAINT:   Urinary frequency/ urgency/ hematuria.       HPI:   Steffanie Nelson is a 68 year old female who presents with symptoms of UTI. Complaining of urinary frequency, urgency, dysuria for last 3 days. (Started Monday night and felt urgency). Tuesday drank lots of water and that seemed to help. This AM, urgency and frequency and saw drop of blood too. Treatments tried: none. Associated symptoms: no N/V/D.  Denies flank pain, abdominal pain, fever, nausea, or vomiting.  Some itching, but no discharge.   Other  hx: no  Hx of STI: no  Hx of pyelonephritis: no  Hx of kidney stone: no  Does have h/o parathyroid issues, so drinks a lot of water to try to avoid any calcium issues.       Current Outpatient Medications   Medication Sig Dispense Refill    nitrofurantoin monohydrate macro 100 MG Oral Cap Take 1 capsule (100 mg total) by mouth 2 (two) times daily. 10 capsule 0    valsartan-hydroCHLOROthiazide 80-12.5 MG Oral Tab TAKE ONE TABLET BY MOUTH ONCE DAILY 100 tablet 1    azelastine 0.1 % Nasal Solution 2 sprays by Nasal route nightly as needed (for sinus congestion). 30 mL 0    clindamycin 1 % External Solution Apply 1 Application topically daily. To affected areas of scalp      ketoconazole 2 % External Shampoo Apply 10 mL topically twice a week.      ergocalciferol 1.25 MG (89737 UT) Oral Cap Take 1 capsule (50,000 Units total) by mouth once a week.      levothyroxine 50 MCG Oral Tab 1 tablet (50 mcg total) daily.      Cholecalciferol (VITAMIN D) 25 MCG (1000 UT) Oral Tab Take 1,000 Int'l Units by mouth daily.      Fiber-Vitamins-Minerals ([facility] FIBER GOOD) Oral Chew Tab Chew 2 tablets by mouth daily.      Doxylamine Succinate, Sleep, (UNISOM OR) Take 0.5 tablets by mouth as needed.        Omeprazole 40 MG Oral Capsule Delayed Release Take 1 capsule (40 mg total) by mouth daily. (Patient not taking: Reported on 8/22/2024) 30 capsule 2    Fluticasone Propionate 50 MCG/ACT Nasal  Suspension 1 spray by Each Nare route daily as needed. (Patient not taking: Reported on 8/22/2024)        Past Medical History:    Depression    Endocrine disorder    H/O mammogram    Pneumonia, organism unspecified(486)    Routine gynecological examination    Sinusitis      Social History:  Social History     Socioeconomic History    Marital status:    Tobacco Use    Smoking status: Never    Smokeless tobacco: Never   Vaping Use    Vaping status: Never Used   Substance and Sexual Activity    Alcohol use: Yes    Drug use: No   Other Topics Concern    Caffeine Concern Yes     Comment: 1 daily     Exercise Yes     Comment: WALKING & aerobics class 2 x week         REVIEW OF SYSTEMS:   GENERAL: Denies fever, chills, or body aches  SKIN: no rashes, no skin wounds or ulcers.  EYES:denies blurred vision or double vision  HEENT: no congestion, rhinorrhea, sore throat or ear pain  CARDIOVASCULAR: denies chest pain or palpitations  LUNGS: denies shortness of breath, cough, or wheezing  GI: See HPI. No N/V/C/D.   : See HPI.  NEURO: no headaches.    EXAM:   /70 (BP Location: Left arm, Patient Position: Sitting, Cuff Size: adult)   Pulse 77   Resp 16   Ht 5' 3.15\" (1.604 m)   Wt 205 lb (93 kg)   SpO2 99%   BMI 36.14 kg/m²   GENERAL: well developed, well nourished,in no apparent distress  CARDIO: RRR, no murmurs  LUNGS: clear to ausculation bilaterally, no wheezing or rhonchi  : no suprapubic tenderness, bladder distention  EXTREMITIES: no edema    Recent Results (from the past 24 hour(s))   Urine Dip, auto without Micro    Collection Time: 08/22/24 12:45 PM   Result Value Ref Range    Glucose Urine Negative Negative mg/dL    Bilirubin Urine Negative Negative    Ketones, UA Negative Negative - Trace mg/dL    Spec Gravity 1.010 1.005 - 1.030    Blood Urine Moderate (A) Negative    PH Urine 6.0 5.0 - 8.0    Protein Urine Negative Negative - Trace mg/dL    Urobilinogen Urine 0.2 0.2 - 1.0 mg/dL    Nitrite  Urine Negative Negative    Leukocyte Esterase Urine Small (A) Negative    APPEARANCE cloudy Clear    Color Urine yellow Yellow    Multistix Lot# [id number] Numeric    Multistix Expiration Date 12/31/2024 Date         ASSESSMENT AND PLAN:   Steffanie Nelson is a 68 year old female presents with UTI symptoms.    ASSESSMENT:  Encounter Diagnosis   Name Primary?    Dysuria Yes       PLAN: Meds as listed below.  Urine sent for culture.     Meds & Refills for this Visit:  Requested Prescriptions     Signed Prescriptions Disp Refills    nitrofurantoin monohydrate macro 100 MG Oral Cap 10 capsule 0     Sig: Take 1 capsule (100 mg total) by mouth 2 (two) times daily.         The patient indicates understanding of these issues and agrees to the plan.  Call if fever, vomiting, worsening symptoms.

## 2024-08-23 ENCOUNTER — OFFICE VISIT (OUTPATIENT)
Facility: CLINIC | Age: 69
End: 2024-08-23
Payer: MEDICARE

## 2024-08-23 VITALS
SYSTOLIC BLOOD PRESSURE: 120 MMHG | HEART RATE: 69 BPM | HEIGHT: 63 IN | RESPIRATION RATE: 16 BRPM | OXYGEN SATURATION: 96 % | WEIGHT: 206 LBS | BODY MASS INDEX: 36.5 KG/M2 | DIASTOLIC BLOOD PRESSURE: 80 MMHG | TEMPERATURE: 97 F

## 2024-08-23 DIAGNOSIS — E66.01 SEVERE OBESITY (BMI 35.0-39.9) WITH COMORBIDITY (HCC): Chronic | ICD-10-CM

## 2024-08-23 DIAGNOSIS — G47.19 DAYTIME HYPERSOMNOLENCE: ICD-10-CM

## 2024-08-23 DIAGNOSIS — I10 ESSENTIAL HYPERTENSION: Primary | ICD-10-CM

## 2024-08-23 DIAGNOSIS — G47.33 OSA (OBSTRUCTIVE SLEEP APNEA): ICD-10-CM

## 2024-08-23 PROCEDURE — 99204 OFFICE O/P NEW MOD 45 MIN: CPT | Performed by: INTERNAL MEDICINE

## 2024-08-23 NOTE — PATIENT INSTRUCTIONS
Plan:        Schedule CPAP titration sleep study to be interpreted by Dr. Paul Stack, will then arrange a NEW CPAP machine   Advised about weight loss   Advised against drowsy driving and to avoid alcoholic beverage and respiratory depressants as these may worsen sleep apnea        Follow up: 3  months        Paul Stack MD      Obstructive Sleep Apnea  Obstructive sleep apnea is a condition caused by air passages becoming narrowed or blocked during sleep. As a result, breathing stops for short periods. Your body wakes up enough for breathing to start again. But you don't remember it. The cycle of stopped breathing and brief awakenings can repeat dozens of times a night. This prevents the body from getting to the deeper stages of sleep that are needed for good rest.   Signs of sleep apnea include loud snoring, noisy breathing, and gasping sounds during sleep. People with sleep apnea often find they use the bathroom many times during the night. Daytime symptoms include waking up tired after a full night's sleep and waking up with headaches. They can also include feeling very sleepy or falling asleep during the day, and having problems with memory or concentration.   Risk factors for sleep apnea include:  Being overweight  Being assigned male at birth, or being in menopause  Smoking  Using alcohol or sedating medicines  Having enlarged structures in the nose or throat such as enlarged tonsils or adenoids, or extra tissue in the airway  Home care  Lifestyle changes that can help treat snoring and sleep apnea include:   If you're overweight, talk with your healthcare provider about a weight-loss plan for you.  Don't drink alcohol for 3 to 4 hours before bedtime.  Don't take sedating medicines. Ask your healthcare provider about the medicines you take.  If you smoke, talk to your provider about ways to quit. It's important to stay away from secondhand smoke. Don't use e-cigarettes because of their harmful side  effects.  Sleep on your side. This can help prevent gravity from pulling relaxed throat tissues into your breathing passages.  If you have allergies or sinus problems that block your nose, ask your provider for help.  Use positive airway pressure (PAP). Discuss with your provider the benefits of using PAP at home. And talk about the type of PAP that's best for you.  Follow-up care  Follow up with your healthcare provider, or as advised. A diagnosis of sleep apnea is made with a sleep study. Your provider can tell you more about this test.   When to get medical care  See your healthcare provider if you have daytime symptoms of sleep apnea. These include:   Waking up tired after a full night's sleep  Waking up with a headache  Feeling very sleepy or falling asleep during the day  Having problems with memory or concentration  Also talk with your provider if your partner tells you that you snore, gasp for air, or stop breathing while you sleep.   Seeing your provider is important because sleep apnea can make you more likely to have certain health problems. These include high blood pressure, heart attack, stroke, and sexual dysfunction. If you have sleep apnea, talk with your healthcare provider about the best treatments for you.   [employer] last reviewed this educational content on 5/1/2022 © 2000-2023 [employer]. All rights reserved. This information is not intended as a substitute for professional medical care. Always follow your healthcare professional's instructions.        Continuous Positive Air Pressure (CPAP)     A mask over the nose gently directs air into the throat to keep the airway open.     Continuous positive air pressure (CPAP) uses gentle air pressure to hold the airway open. CPAP is often the most effective treatment for sleep apnea. It works very well as a treatment for adults diagnosed with obstructive sleep apnea with a lot of sleepiness. But keep in mind that it can take several  adjustments before the setup is right for you.   How CPAP works  The CPAP machine  is a small portable pump that sits beside the bed. The pump sends air through a hose, which is held over your nose alone, or nose and mouth by a mask. Mild air pressure is gently pushed through your airway. The air pressure nudges sagging tissues aside. This widens the airway so you can breathe better. CPAP may be combined with other kinds of therapy for sleep apnea.   Types of air pressure treatments  There are different types of CPAP. Your doctor or CPAP technician will help you decide which type is best for you:   Basic CPAP keeps the pressure constant all night long.  A bilevel device (BiPAP) provides more pressure when you breathe in and less when you breathe out. A BiPAP machine also may be set to provide automatic breaths to maintain breathing if you stop breathing while sleeping.  An autoCPAP device automatically adjusts pressure throughout the night and in response to changes such as body position, sleep stage, and snoring.  [employer] last reviewed this educational content on 7/1/2019 © 2000-2023 [employer]. All rights reserved. This information is not intended as a substitute for professional medical care. Always follow your healthcare professional's instructions.

## 2024-08-23 NOTE — PROGRESS NOTES
Pulmonary/Critical Care/Sleep Medicine    Consult Note     PCP: Ashley Wong DO   Phone: [phone]   Fax: [phone]     Ref Provider: No ref. provider found     Chief Complaint   Patient presents with    New Patient     Here today for sleep evaluation and treatment discuss treatment. Had a recent sleep study         HPI  I had the pleasure of seeing Steffanie Nelson who is a pleasant 68 year old female who presents for evaluation of LANDY       The patient states that she has snored at home in past and was noted to have witnessed apnea,, she had fatigue, a sleep study performed showed LANDY,  so she presents for sleep evaluation.      She states bed time around 930-10  PM . It takes 30 min  to fall asleep with Unisom and leaves bed around 630-7  AM. She wakes up sometimes 1 times a night.  She is sleepy and fatigued during the daytime.  She admits to tossing and turning at night.  She denies nightmares, sleep talking or sleep walking.  She admits to occasional  AM headaches.  She denies symptoms sleep attacks     The patient denies kicking legs at night.   Admits to teeth grinding and uses dental guard.       She drinks  2  caffeine glasses of tea and  occ caffeine cans of soda daily.       She has no pets           Hx of tobacco use: She  reports that she has never smoked. She has never used smokeless tobacco.    Past Medical History:    Aortic aneurysm, thoracic (HCC)    ascending    Depression    Endocrine disorder    H/O mammogram    Hyperparathyroidism (HCC)    Hypothyroidism    Pneumonia, organism unspecified(486)    Routine gynecological examination    Sinusitis      Past Surgical History:   Procedure Laterality Date    Breast surgery procedure unlisted  2001    2 nodules in R breast removed    Colonoscopy  2007    Colonoscopy  2013    Dr. CHOWDARY -- due in 10 years    Cyst aspiration right Right 2003    Cyst aspiration right Right 2007    D & c  11/28/2011    Dual energy x-ray absorptiometry, body  composition study, 1+ sites  11/12/2009    Hysterectomy  02/2013    no BSO, TVH    Other surgical history  01/12/2005    R parathyroid \"ectomy 1 lower R\"    Thyroid lobectomy,unilat Right 01/12/2005    right thyroid nodule removal    Vaginal hysterectomy, uterus >250 gms; w/removal, tube(s) &/or ovary(s) w/repair of enterocele  02/15/2013     Allergies   Allergen Reactions    Seasonal Runny nose     Current Outpatient Medications   Medication Sig Dispense Refill    nitrofurantoin monohydrate macro 100 MG Oral Cap Take 1 capsule (100 mg total) by mouth 2 (two) times daily. 10 capsule 0    valsartan-hydroCHLOROthiazide 80-12.5 MG Oral Tab TAKE ONE TABLET BY MOUTH ONCE DAILY 100 tablet 1    clindamycin 1 % External Solution Apply 1 Application topically daily. To affected areas of scalp      ketoconazole 2 % External Shampoo Apply 10 mL topically twice a week.      ergocalciferol 1.25 MG (15387 UT) Oral Cap Take 1 capsule (50,000 Units total) by mouth once a week.      levothyroxine 50 MCG Oral Tab 1 tablet (50 mcg total) daily.      Cholecalciferol (VITAMIN D) 25 MCG (1000 UT) Oral Tab Take 1,000 Int'l Units by mouth daily.      Fiber-Vitamins-Minerals ([place] DAILY CARE FIBER GOOD) Oral Chew Tab Chew 2 tablets by mouth daily.      Doxylamine Succinate, Sleep, (UNISOM OR) Take 0.5 tablets by mouth as needed.        Omeprazole 40 MG Oral Capsule Delayed Release Take 1 capsule (40 mg total) by mouth daily. (Patient not taking: Reported on 8/22/2024) 30 capsule 2    azelastine 0.1 % Nasal Solution 2 sprays by Nasal route nightly as needed (for sinus congestion). (Patient not taking: Reported on 8/23/2024) 30 mL 0    Fluticasone Propionate 50 MCG/ACT Nasal Suspension 1 spray by Each Nare route daily as needed. (Patient not taking: Reported on 8/22/2024)        Social History     Socioeconomic History    Marital status:    Occupational History    Occupation: [employer]     Comment: [employer]   Tobacco Use     Smoking status: Never    Smokeless tobacco: Never   Vaping Use    Vaping status: Never Used   Substance and Sexual Activity    Alcohol use: Yes     Comment: Social wine    Drug use: No   Other Topics Concern    Caffeine Concern Yes     Comment: 1 daily     Exercise Yes     Comment: WALKING & aerobics class 2 x week      Immunization History   Administered Date(s) Administered    >=9 YRS AFLURIA TRI PRESERV FREE SINGLE DOSE (39786) FLU CLINIC 12/08/2015    Covid-19 Vaccine Pfizer 30 mcg/0.3 ml 03/17/2021, 04/14/2021, 10/18/2021    Covid-19 Vaccine Pfizer Bivalent 30mcg/0.3mL 01/16/2023    Covid-19 Vaccine Pfizer Telly-Sucrose 30 mcg/0.3 ml 05/18/2022    FLU VAC High Dose 65 YRS & Older PRSV Free (17041) 10/31/2022, 10/30/2023    FLULAVAL 6 months & older 0.5 ml Prefilled syringe (00040) 10/26/2018, 10/31/2019    FLUZONE 6 months and older PFS 0.5 ml (89629) 09/23/2016    Flublok Quad Influenza Vaccine (67272) 10/01/2020, 10/04/2021    Flucelvax 0.5ml Vaccine 10/16/2017    Flucelvax Influenza vaccine, trivalent (ccIIV3), 0.5mL IM 10/16/2017    Fluvirin, 3 Years & >, Im 01/23/2014, 10/03/2014    HEP A 01/25/2013    HEP B, Adult 12/08/2015, 03/21/2016, 11/07/2016    Hep A, Adult 05/08/2014    Influenza 10/03/2014, 10/17/2017, 10/01/2020    Pfizer Covid-19 Vaccine 30mcg/0.3ml 12yrs+ ([id number]) 09/19/2023    Pneumococcal Conjugate PCV20 08/28/2023    Positive Measles Titer 02/11/2015    Positive Mumps Titer 02/11/2015    Positive Rubella Titer 02/11/2015    TDAP 01/25/2013, 06/29/2022    Zoster Vaccine Recombinant Adjuvanted (Shingrix) 02/02/2021, 06/09/2021      Family History   Problem Relation Age of Onset    Other (angina pectoris) Mother 102    Other (covid) Mother     Other (alzheimer's disease) Father     Breast Cancer Sister 37        Late 30's. 2nd time 67    Other (stroke syndrome) Maternal Grandmother         brain aneurysm    Other (stroke syndrome) Other         mat uncle, brain aneurysm        Review of  Systems   Constitutional:  Negative for fatigue, fever and unexpected weight change.   HENT:  Negative for congestion, mouth sores, nosebleeds, postnasal drip, rhinorrhea, sore throat and trouble swallowing.    Eyes:  Negative for visual disturbance.   Respiratory:  Negative for apnea, cough, choking, chest tightness, shortness of breath and wheezing.    Cardiovascular:  Positive for leg swelling. Negative for chest pain and palpitations.   Gastrointestinal:  Negative for abdominal pain, constipation, diarrhea, nausea and vomiting.   Genitourinary:  Negative for difficulty urinating.   Musculoskeletal:  Negative for arthralgias, back pain, gait problem and myalgias.   Neurological:  Positive for headaches. Negative for dizziness and weakness.   Psychiatric/Behavioral:  Positive for sleep disturbance.         Vitals:    08/23/24 0940   BP: 120/80   Pulse: 69   Resp: 16   Temp: 96.9 °F (36.1 °C)      SpO2: 96 %  Ht Readings from Last 1 Encounters:   08/23/24 5' 3\" (1.6 m)     Wt Readings from Last 1 Encounters:   08/23/24 206 lb (93.4 kg)     Body mass index is 36.49 kg/m².     Physical Exam  Constitutional:       General: She is not in acute distress.     Appearance: Normal appearance. She is obese. She is not ill-appearing or diaphoretic.   HENT:      Head: Normocephalic and atraumatic.      Nose: Nose normal. No congestion or rhinorrhea.      Mouth/Throat:      Mouth: Mucous membranes are moist.      Pharynx: Oropharynx is clear. No oropharyngeal exudate or posterior oropharyngeal erythema.      Comments: Mallampati class III palate   Eyes:      Extraocular Movements: Extraocular movements intact.      Pupils: Pupils are equal, round, and reactive to light.   Cardiovascular:      Rate and Rhythm: Normal rate.      Pulses: Normal pulses.      Heart sounds: Normal heart sounds. No murmur heard.  Pulmonary:      Effort: Pulmonary effort is normal. No respiratory distress.      Breath sounds: Normal breath sounds. No  wheezing or rhonchi.   Chest:      Chest wall: No tenderness.   Abdominal:      General: Abdomen is flat. Bowel sounds are normal.      Palpations: Abdomen is soft.   Musculoskeletal:         General: Normal range of motion.   Skin:     General: Skin is warm.   Neurological:      General: No focal deficit present.      Mental Status: She is alert and oriented to person, place, and time.   Psychiatric:         Mood and Affect: Mood normal.         Behavior: Behavior normal.         Thought Content: Thought content normal.         Judgment: Judgment normal.             Labs:  Last BMP  Lab Results   Component Value Date    GLU 84 12/04/2023    BUN 12 12/04/2023    CREATSERUM 0.75 12/04/2023    BUNCREA SEE NOTE: 12/04/2023    GFRAA 106 07/19/2022    GFRNAA 92 07/19/2022    CA 10.0 12/04/2023     12/04/2023    K 4.4 12/04/2023     12/04/2023    CO2 30 12/04/2023      Last CBC  Lab Results   Component Value Date    WBC 5.3 06/01/2023    RBC 4.52 06/01/2023    HGB 13.2 06/01/2023    HCT 41.2 06/01/2023    MCV 91.2 06/01/2023    MCH 29.2 06/01/2023    MCHC 32.0 06/01/2023    RDW 13.3 06/01/2023     06/01/2023      Last CMP  Lab Results   Component Value Date    GLU 84 12/04/2023    BUN 12 12/04/2023    BUNCREA SEE NOTE: 12/04/2023    CREATSERUM 0.75 12/04/2023    GFRNAA 92 07/19/2022    GFRAA 106 07/19/2022    CA 10.0 12/04/2023    ALKPHO 58 12/04/2023    AST 17 12/04/2023    ALT 16 12/04/2023    BILT 0.4 12/04/2023    TP 6.4 12/04/2023    ALB 4.2 12/04/2023    GLOBULIN 2.2 12/04/2023    AGRATIO 1.9 12/04/2023     12/04/2023    K 4.4 12/04/2023     12/04/2023    CO2 30 12/04/2023      Last Thyroid Function  Lab Results   Component Value Date    T4F 1.1 06/01/2023    TSH 1.00 06/01/2023    TSHT4 1.27 12/22/2011        Imaging:     PROCEDURE:  XR CHEST PA + LAT CHEST (CPT=71046)     INDICATIONS:  R05.3 Chronic cough     COMPARISON:  None.     TECHNIQUE:  PA and lateral chest radiographs were  obtained.     PATIENT STATED HISTORY: (As transcribed by Technologist)  Dry cough since Covid September 2022.                         Impression  CONCLUSION:       Normal cardiac and mediastinal contours.  No pulmonary edema or focal airspace consolidation.  The pleural spaces are clear.  Minimal degenerative change of the thoracic spine.  Surgical clips of the thoracic inlet noted.           LOCATION:  [place]        Dictated by (CST): Roger Ferguson MD on 3/18/2024 at 9:30 AM      Finalized by (CST): Roger Ferguson MD on 3/18/2024 at 9:30 AM          Study 7/15/2024 Type: DIAGNOSTIC   Procedure: The Polysomnogram was performed with a sleep technologist in attendance at the [facility]. The following parameters were monitored: central, occipital and temporal EEG, EOG, submentalis EMG, nasal flow, nasal pressure, respiratory inductance plethysmography and oxygen saturation via continuous pulse oximetry. Sleep stages, periodic limb movements and EEG arousals were scored in accordance with the American Academy of Sleep Medicine Manual for the Scoring of Sleep and Associated Events. Apnea Hypopnea Index was calculated using the recommended definition of hypopnea for scoring respiratory events. Data acquisition, collection and scoring have been validated and clinically correlated.   Sleep History: ESTRELLA COWART is a 68 year old Female referred by SEDRICK HAMILTON for the evaluation of suspected sleep disordered breathing.   Past Medical History is pertinent for HYpersomnolence, Obstructive Sleep Apnea, nocturia, Depression, endocrine disorder, pneumonia.   At the time of the study, the patient was prescribed the following medications: Valsartan-Hydrochlorothiazide, levothyroxine, Ergocalciferol, Cholecalciferol, Fluticasone Propionate, Doxylamine Succinate.   The patient was studied from 10:36:37 PM to 05:20:33 AM, with a total recording time of 403.9, total sleep time of 335.0 and a sleep efficiency of 82.9%.  Wake after sleep onset was 66.5 minutes. The percentage of total sleep time by sleep stage is as follows: Stage 1 12.2%, Stage 2 48.5%, Stage 3 26.4% and Stage REM 12.8%.   Respiratory Analysis: The Apnea-Hypopnea Index (AHI) was 30.1 events per hour. REM related AHI was 40.5 events per hour. Supine related AHI was 50.2. Lateral related AHI was 5.9. The Central Apnea Index was 0.5. The oxygen saturation john was 86% and patient spent 1.1% with saturations less than 88%.   Snoring Profile: Snoring was moderate.   Cardiac Profile: Electrocardiogram demonstrated normal sinus rhythm.   EEG Profile: Based on the limited recording montage, there were no significant EEG abnormalities noted. There were 201 arousals, with a total arousal index of 36.0.   Periodic Limb Movements: PLM index of 16.5. PLM arousal index of 7.2.   IMPRESSIONS: This study demonstrates severe obstructive sleep apnea.   Diagnosis: Obstructive Sleep Apnea G47.33   RECOMMENDATIONS:   1. It is recommended to proceed with CPAP titration.   2. The patient should avoid alcohol and sedative medications, as these may worsen severity of symptoms.   3. The patient should avoid drowsy driving.   4. Patient to follow up with a sleep specialist in clinic.     Thank you for allowing me to participate in this patient's care. Please do not hesitate to contact me with any additional questions.   Dictated by: Dr. Pride   Electronically signed by Cindy Pride MD   (Electronic Signature)   Board Certified in Sleep Medicine     [place] Sleepiness Scale: (ESS) score on today's visit is 11 out of 24.     Score total of 1-6    Normal sleep   Score total of 7-8    Average sleepiness   Score total of 9-24    Abnormal (possibly pathologic) sleepiness       Impression:    Obstructive sleep apnea syndrome (OSAS):  Attended sleep study performed on 7/15/2024 showed  Apnea-Hypopnea Index (AHI) was 30.1 events per hour. REM related AHI was 40.5 events per hour. Supine related  AHI was 50.2. Lateral related AHI was 5.9. The Central Apnea Index was 0.5. The oxygen saturation john was 86% and patient spent 1.1% with saturations less than 88%.   Daytime hypersomnolence/fatigue  Obesity: Class II   ;  Body mass index is 36.49 kg/m².  Bruxism: Admits to teeth grinding and uses dental guard.  Insomnia with difficulty initiating sleep   Depression  Hypertension   Hypothyroidism   Aortic Aneurysm, ascending thoracic aorta followed by Dr. Aranda in Cardiology                               Plan:      Schedule CPAP titration sleep study to be interpreted by Dr. Paul Stack, will then arrange a NEW CPAP machine   Advised about weight loss   Advised against drowsy driving and to avoid alcoholic beverage and respiratory depressants as these may worsen sleep apnea      Follow up: 3  months     Thank you for allowing me to participate in your patient care.    TREVOR Stack MD, FACP, FCCP, [facility] - Pulmonary/Critical care/Sleep Medicine  Please contact our office if you have any questions or concerns at [phone]    Note to the patient: The 21st Century Cures Act makes medical notes like these available to patients in the interest of transparency. However, be advised that this is a medical document. It is intended as peer to peer communication. It is written in medical language and may contain abbreviations or verbiage that are unfamiliar. It may appear blunt or direct. Medical documents are intended to carry relevant information, facts as evident, and clinical opinion of the practitioner.      Disclaimer: Components of this note were documented using voice recognition system and are subject to errors not corrected at proofreading. Contact the author of this note for any clarifications

## 2024-09-19 ENCOUNTER — PATIENT MESSAGE (OUTPATIENT)
Dept: FAMILY MEDICINE CLINIC | Facility: CLINIC | Age: 69
End: 2024-09-19

## 2024-09-19 NOTE — TELEPHONE ENCOUNTER
From: Steffanie Nelson  To: Ashley Wong  Sent: 9/19/2024 11:31 AM CDT  Subject: Test before Oct 1 appointmnet    I had blood test on 6/14/2024 prior to consult with Dr. Slater that included TSH, Vit D and T4, Free. The tests were done at [facility]. These tests are part of the tests that you ordered. Do I need to repeat these 3 tests? My plan was to get the other 6 tests that you ordered done before my 10/1 appt. If I do not need to repeat these 3 tests, does the order for tests need to be revised or can I tell them to not do those 3 tests?  -Steffanie

## 2024-09-26 ENCOUNTER — LAB ENCOUNTER (OUTPATIENT)
Dept: LAB | Age: 69
End: 2024-09-26
Attending: FAMILY MEDICINE
Payer: MEDICARE

## 2024-09-26 DIAGNOSIS — R73.9 HYPERGLYCEMIA: ICD-10-CM

## 2024-09-26 DIAGNOSIS — E78.5 DYSLIPIDEMIA: ICD-10-CM

## 2024-09-26 DIAGNOSIS — I10 ESSENTIAL HYPERTENSION: ICD-10-CM

## 2024-09-26 LAB
ALBUMIN SERPL-MCNC: 3.9 G/DL (ref 3.2–4.8)
ALBUMIN/GLOB SERPL: 1.4 {RATIO} (ref 1–2)
ALP LIVER SERPL-CCNC: 62 U/L
ALT SERPL-CCNC: 19 U/L
ANION GAP SERPL CALC-SCNC: 6 MMOL/L (ref 0–18)
AST SERPL-CCNC: 22 U/L (ref ?–34)
BASOPHILS # BLD AUTO: 0.03 X10(3) UL (ref 0–0.2)
BASOPHILS NFR BLD AUTO: 0.6 %
BILIRUB SERPL-MCNC: 0.5 MG/DL (ref 0.2–1.1)
BILIRUB UR QL STRIP.AUTO: NEGATIVE
BUN BLD-MCNC: 11 MG/DL (ref 9–23)
CALCIUM BLD-MCNC: 10.3 MG/DL (ref 8.7–10.4)
CHLORIDE SERPL-SCNC: 105 MMOL/L (ref 98–112)
CHOLEST SERPL-MCNC: 180 MG/DL (ref ?–200)
CLARITY UR REFRACT.AUTO: CLEAR
CO2 SERPL-SCNC: 27 MMOL/L (ref 21–32)
COLOR UR AUTO: COLORLESS
CREAT BLD-MCNC: 0.68 MG/DL
EGFRCR SERPLBLD CKD-EPI 2021: 96 ML/MIN/1.73M2 (ref 60–?)
EOSINOPHIL # BLD AUTO: 0.16 X10(3) UL (ref 0–0.7)
EOSINOPHIL NFR BLD AUTO: 3.4 %
ERYTHROCYTE [DISTWIDTH] IN BLOOD BY AUTOMATED COUNT: 13.3 %
EST. AVERAGE GLUCOSE BLD GHB EST-MCNC: 111 MG/DL (ref 68–126)
FASTING PATIENT LIPID ANSWER: YES
FASTING STATUS PATIENT QL REPORTED: YES
GLOBULIN PLAS-MCNC: 2.7 G/DL (ref 2–3.5)
GLUCOSE BLD-MCNC: 85 MG/DL (ref 70–99)
GLUCOSE UR STRIP.AUTO-MCNC: NORMAL MG/DL
HBA1C MFR BLD: 5.5 % (ref ?–5.7)
HCT VFR BLD AUTO: 39.4 %
HDLC SERPL-MCNC: 58 MG/DL (ref 40–59)
HGB BLD-MCNC: 13.1 G/DL
IMM GRANULOCYTES # BLD AUTO: 0 X10(3) UL (ref 0–1)
IMM GRANULOCYTES NFR BLD: 0 %
KETONES UR STRIP.AUTO-MCNC: NEGATIVE MG/DL
LDLC SERPL CALC-MCNC: 104 MG/DL (ref ?–100)
LEUKOCYTE ESTERASE UR QL STRIP.AUTO: 250
LYMPHOCYTES # BLD AUTO: 1.61 X10(3) UL (ref 1–4)
LYMPHOCYTES NFR BLD AUTO: 34 %
MCH RBC QN AUTO: 29.3 PG (ref 26–34)
MCHC RBC AUTO-ENTMCNC: 33.2 G/DL (ref 31–37)
MCV RBC AUTO: 88.1 FL
MONOCYTES # BLD AUTO: 0.4 X10(3) UL (ref 0.1–1)
MONOCYTES NFR BLD AUTO: 8.5 %
NEUTROPHILS # BLD AUTO: 2.53 X10 (3) UL (ref 1.5–7.7)
NEUTROPHILS # BLD AUTO: 2.53 X10(3) UL (ref 1.5–7.7)
NEUTROPHILS NFR BLD AUTO: 53.5 %
NITRITE UR QL STRIP.AUTO: NEGATIVE
NONHDLC SERPL-MCNC: 122 MG/DL (ref ?–130)
OSMOLALITY SERPL CALC.SUM OF ELEC: 285 MOSM/KG (ref 275–295)
PH UR STRIP.AUTO: 7 [PH] (ref 5–8)
PLATELET # BLD AUTO: 252 10(3)UL (ref 150–450)
POTASSIUM SERPL-SCNC: 3.7 MMOL/L (ref 3.5–5.1)
PROT SERPL-MCNC: 6.6 G/DL (ref 5.7–8.2)
PROT UR STRIP.AUTO-MCNC: NEGATIVE MG/DL
RBC # BLD AUTO: 4.47 X10(6)UL
RBC UR QL AUTO: NEGATIVE
SODIUM SERPL-SCNC: 138 MMOL/L (ref 136–145)
SP GR UR STRIP.AUTO: 1.01 (ref 1–1.03)
TRIGL SERPL-MCNC: 98 MG/DL (ref 30–149)
UROBILINOGEN UR STRIP.AUTO-MCNC: NORMAL MG/DL
VLDLC SERPL CALC-MCNC: 16 MG/DL (ref 0–30)
WBC # BLD AUTO: 4.7 X10(3) UL (ref 4–11)

## 2024-09-26 PROCEDURE — 85025 COMPLETE CBC W/AUTO DIFF WBC: CPT

## 2024-09-26 PROCEDURE — 83036 HEMOGLOBIN GLYCOSYLATED A1C: CPT

## 2024-09-26 PROCEDURE — 36415 COLL VENOUS BLD VENIPUNCTURE: CPT

## 2024-09-26 PROCEDURE — 81001 URINALYSIS AUTO W/SCOPE: CPT

## 2024-09-26 PROCEDURE — 80053 COMPREHEN METABOLIC PANEL: CPT

## 2024-09-26 PROCEDURE — 87086 URINE CULTURE/COLONY COUNT: CPT

## 2024-09-26 PROCEDURE — 80061 LIPID PANEL: CPT

## 2024-10-01 ENCOUNTER — OFFICE VISIT (OUTPATIENT)
Dept: FAMILY MEDICINE CLINIC | Facility: CLINIC | Age: 69
End: 2024-10-01
Payer: MEDICARE

## 2024-10-01 VITALS
RESPIRATION RATE: 16 BRPM | DIASTOLIC BLOOD PRESSURE: 70 MMHG | BODY MASS INDEX: 36.32 KG/M2 | HEIGHT: 63 IN | OXYGEN SATURATION: 96 % | WEIGHT: 205 LBS | SYSTOLIC BLOOD PRESSURE: 118 MMHG | HEART RATE: 71 BPM

## 2024-10-01 DIAGNOSIS — Z12.31 ENCOUNTER FOR SCREENING MAMMOGRAM FOR MALIGNANT NEOPLASM OF BREAST: ICD-10-CM

## 2024-10-01 DIAGNOSIS — E04.2 MULTIPLE THYROID NODULES: ICD-10-CM

## 2024-10-01 DIAGNOSIS — Z12.11 SCREENING FOR MALIGNANT NEOPLASM OF COLON: ICD-10-CM

## 2024-10-01 DIAGNOSIS — Z71.85 VACCINE COUNSELING: ICD-10-CM

## 2024-10-01 DIAGNOSIS — E66.9 OBESITY (BMI 30-39.9): ICD-10-CM

## 2024-10-01 DIAGNOSIS — R73.9 HYPERGLYCEMIA: ICD-10-CM

## 2024-10-01 DIAGNOSIS — E21.3 HYPERPARATHYROIDISM (HCC): ICD-10-CM

## 2024-10-01 DIAGNOSIS — E55.9 VITAMIN D DEFICIENCY: ICD-10-CM

## 2024-10-01 DIAGNOSIS — G47.33 OSA (OBSTRUCTIVE SLEEP APNEA): ICD-10-CM

## 2024-10-01 DIAGNOSIS — Z91.09 ENVIRONMENTAL ALLERGIES: ICD-10-CM

## 2024-10-01 DIAGNOSIS — I10 ESSENTIAL HYPERTENSION: Primary | ICD-10-CM

## 2024-10-01 DIAGNOSIS — E78.5 DYSLIPIDEMIA: ICD-10-CM

## 2024-10-01 DIAGNOSIS — Z79.899 MEDICATION MANAGEMENT: ICD-10-CM

## 2024-10-01 DIAGNOSIS — K21.00 GASTROESOPHAGEAL REFLUX DISEASE WITH ESOPHAGITIS WITHOUT HEMORRHAGE: ICD-10-CM

## 2024-10-01 DIAGNOSIS — Z12.11 SCREENING FOR COLON CANCER: ICD-10-CM

## 2024-10-01 DIAGNOSIS — I71.20 THORACIC AORTIC ANEURYSM WITHOUT RUPTURE, UNSPECIFIED PART (HCC): ICD-10-CM

## 2024-10-01 DIAGNOSIS — F32.1 CURRENT MODERATE EPISODE OF MAJOR DEPRESSIVE DISORDER WITHOUT PRIOR EPISODE (HCC): ICD-10-CM

## 2024-10-01 DIAGNOSIS — E89.0 POSTOPERATIVE HYPOTHYROIDISM: ICD-10-CM

## 2024-10-01 PROCEDURE — 99214 OFFICE O/P EST MOD 30 MIN: CPT | Performed by: FAMILY MEDICINE

## 2024-10-01 PROCEDURE — 99499 UNLISTED E&M SERVICE: CPT | Performed by: FAMILY MEDICINE

## 2024-10-01 RX ORDER — AZELASTINE 1 MG/ML
2 SPRAY, METERED NASAL 2 TIMES DAILY
Qty: 30 ML | Refills: 2 | Status: SHIPPED | OUTPATIENT
Start: 2024-10-01

## 2024-10-01 NOTE — PROGRESS NOTES
Steffanie Nelson is a 69 year old female.  HPI:   PT. Is here for a med check.  Allergies -- pt would like to know if there is another nasal spray that is different from flonase?  She does not like the idea of using a nasal steroid all the time.  HTN -- stable on valsartan/HCTZ  Hypothyroid -- stable on levothyroxine  Vitamin D def -- stable on vitamin D   Seeing Dr. Slater for her thyroid and parathyroid.  Has LANDY but never used CPAP.  Will get the one at the end of the month.  Had 2 boughts of vomiting -- woke up in the middle in the night and vomited and until she was empty  -- they were months apart  Obese --  lost 1 lb  Dr. Suri drew vitamin D and thyroid labs and per pt, they were normal.    Meds reviewed.      VALSARTAN-HYDROCHLOROTHIAZIDE 80-12.5 MG Oral Tab, Take 1 tablet by mouth daily., Disp: 30 tablet, Rfl: 0  levothyroxine 50 MCG Oral Tab, 50 mcg daily., Disp: , Rfl:   Ergocalciferol (VITAMIN D CAPS 93874 IU OR), 50,000 Units once a week., Disp: , Rfl:   Cholecalciferol (VITAMIN D) 25 MCG (1000 UT) Oral Tab, Take 2,000 Int'l Units by mouth daily., Disp: , Rfl:   Fiber-Vitamins-Minerals ([facility] DAILY CARE FIBER GOOD) Oral Chew Tab, Chew 2 tablets by mouth daily., Disp: , Rfl:   Fluticasone Propionate 50 MCG/ACT Nasal Suspension, 1 spray by Each Nare route daily as needed.  , Disp: , Rfl:   Doxylamine Succinate, Sleep, (UNISOM OR), Take 0.5 tablets by mouth as needed.  , Disp: , Rfl:     No current facility-administered medications for this visit.     Allergies   Allergen Reactions    Seasonal Runny nose      Past Medical History:    Aortic aneurysm, thoracic (HCC)    ascending    Depression    Endocrine disorder    H/O mammogram    Hyperparathyroidism (HCC)    Hypertension    Hypothyroidism    Pneumonia, organism unspecified(486)    Routine gynecological examination    Sinusitis      Social History:  Social History     Socioeconomic History    Marital status:    Occupational History     Occupation: [employer]     Comment: [employer]   Tobacco Use    Smoking status: Never    Smokeless tobacco: Never   Vaping Use    Vaping status: Never Used   Substance and Sexual Activity    Alcohol use: Yes     Comment: Social wine    Drug use: No   Other Topics Concern    Caffeine Concern Yes     Comment: 1 daily     Exercise Yes     Comment: WALKING & aerobics class 2 x week        Results for orders placed or performed in visit on 09/26/24   CBC W Differential W Platelet [E]   Result Value Ref Range    WBC 4.7 4.0 - 11.0 x10(3) uL    RBC 4.47 3.80 - 5.30 x10(6)uL    HGB 13.1 12.0 - 16.0 g/dL    HCT 39.4 35.0 - 48.0 %    .0 150.0 - 450.0 10(3)uL    MCV 88.1 80.0 - 100.0 fL    MCH 29.3 26.0 - 34.0 pg    MCHC 33.2 31.0 - 37.0 g/dL    RDW 13.3 %    Neutrophil Absolute Prelim 2.53 1.50 - 7.70 x10 (3) uL    Neutrophil Absolute 2.53 1.50 - 7.70 x10(3) uL    Lymphocyte Absolute 1.61 1.00 - 4.00 x10(3) uL    Monocyte Absolute 0.40 0.10 - 1.00 x10(3) uL    Eosinophil Absolute 0.16 0.00 - 0.70 x10(3) uL    Basophil Absolute 0.03 0.00 - 0.20 x10(3) uL    Immature Granulocyte Absolute 0.00 0.00 - 1.00 x10(3) uL    Neutrophil % 53.5 %    Lymphocyte % 34.0 %    Monocyte % 8.5 %    Eosinophil % 3.4 %    Basophil % 0.6 %    Immature Granulocyte % 0.0 %   Comp Metabolic Panel (14) [E]   Result Value Ref Range    Glucose 85 70 - 99 mg/dL    Sodium 138 136 - 145 mmol/L    Potassium 3.7 3.5 - 5.1 mmol/L    Chloride 105 98 - 112 mmol/L    CO2 27.0 21.0 - 32.0 mmol/L    Anion Gap 6 0 - 18 mmol/L    BUN 11 9 - 23 mg/dL    Creatinine 0.68 0.55 - 1.02 mg/dL    Calcium, Total 10.3 8.7 - 10.4 mg/dL    Calculated Osmolality 285 275 - 295 mOsm/kg    eGFR-Cr 96 >=60 mL/min/1.73m2    AST 22 <34 U/L    ALT 19 10 - 49 U/L    Alkaline Phosphatase 62 55 - 142 U/L    Bilirubin, Total 0.5 0.2 - 1.1 mg/dL    Total Protein 6.6 5.7 - 8.2 g/dL    Albumin 3.9 3.2 - 4.8 g/dL    Globulin  2.7 2.0 - 3.5 g/dL    A/G Ratio 1.4 1.0 - 2.0    Patient  Fasting for CMP? Yes    Lipid Panel [E]   Result Value Ref Range    Cholesterol, Total 180 <200 mg/dL    HDL Cholesterol 58 40 - 59 mg/dL    Triglycerides 98 30 - 149 mg/dL    LDL Cholesterol 104 (H) <100 mg/dL    VLDL 16 0 - 30 mg/dL    Non HDL Chol 122 <130 mg/dL    Patient Fasting for Lipid? Yes    Hemoglobin A1C [E]   Result Value Ref Range    HgbA1C 5.5 <5.7 %    Estimated Average Glucose 111 68 - 126 mg/dL   UA/M with Culture Reflex    Specimen: Urine, clean catch   Result Value Ref Range    Urine Color Colorless (A) Yellow    Clarity Urine Clear Clear    Spec Gravity 1.006 1.005 - 1.030    Glucose Urine Normal Normal mg/dL    Bilirubin Urine Negative Negative    Ketones Urine Negative Negative mg/dL    Blood Urine Negative Negative    pH Urine 7.0 5.0 - 8.0    Protein Urine Negative Negative mg/dL    Urobilinogen Urine Normal Normal mg/dL    Nitrite Urine Negative Negative    Leukocyte Esterase Urine 250 (A) Negative    WBC Urine 1-5 0 - 5 /HPF    RBC Urine 0-2 0 - 2 /HPF    Bacteria Urine Rare (A) None Seen /HPF    Squamous Epi. Cells Few (A) None Seen /HPF    Renal Tubular Epithelial Cells None Seen None Seen /HPF    Transitional Cells None Seen None Seen /HPF    Yeast Urine None Seen None Seen /HPF   Urine Culture, Routine    Specimen: Urine, clean catch   Result Value Ref Range    Urine Culture No Growth at 18-24 hrs.        REVIEW OF SYSTEMS:   GENERAL: feels well otherwise  SKIN: denies any unusual skin lesions  LUNGS: denies shortness of breath with exertion  CARDIOVASCULAR: denies chest pain on exertion  GI: denies abdominal pain,denies heartburn  MUSCULOSKELETAL: denies back pain  EXTREMITIES:  No pain or numbness    EXAM:   /70 (BP Location: Left arm, Patient Position: Sitting, Cuff Size: adult)   Pulse 71   Resp 16   Ht 5' 3\" (1.6 m)   Wt 205 lb (93 kg)   SpO2 96%   BMI 36.31 kg/m²   GENERAL: well developed, well nourished,in no apparent distress  SKIN: no rashes,no suspicious  lesions  HEENT: atraumatic, normocephalic  NECK: supple,no adenopathy,no bruits; right thyroid removed; left with thyroid nodules  LUNGS: clear to auscultation  CARDIO: RRR without murmur  GI: soft, good BS's; no HSM  EXTREMITIES: no cyanosis, clubbing or edema    ASSESSMENT AND PLAN:     Encounter Diagnoses   Name Primary?    Essential hypertension Yes    Dyslipidemia     Hyperglycemia     Hyperparathyroidism (HCC)     Postoperative hypothyroidism     Current moderate episode of major depressive disorder without prior episode (HCC)     LANDY (obstructive sleep apnea)     Thoracic aortic aneurysm without rupture, unspecified part (HCC)     Multiple thyroid nodules     Gastroesophageal reflux disease with esophagitis without hemorrhage     Vitamin D deficiency     Medication management     Screening for malignant neoplasm of colon     Vaccine counseling     Obesity (BMI 30-39.9)     Screening for colon cancer     Environmental allergies     Encounter for screening mammogram for malignant neoplasm of breast        No orders of the defined types were placed in this encounter.      Meds & Refills for this Visit:  Requested Prescriptions     Signed Prescriptions Disp Refills    azelastine 0.1 % Nasal Solution 30 mL 2     Si sprays by Nasal route 2 (two) times daily.       Imaging & Consults:  GASTRO - INTERNAL  [place] FOX 2D+3D SCREENING BILAT (CPT=77067/36391)       HTN -- stable on valsartan/HCTZ  Dyslipidemia --  stable  Vitamin D def -- per Dr. Slater  Hypothyroid/hyperparathyroid /thyroid nodules-- per Dr. Slater  Obese -- focus on diet and exercise; pt. States she will focus on meal prep in 2024  LANDY/hypersomnolence -- stable; getting new cpap  Thoracic aortic aneurysm -- stable  GERD -- stable  Allergies -- advised azelastine instead of flonase      Advised to schedule colonoscopy and discuss with Dr. MAYORGA  Labs reviewed.  Deferred weight check in 3 months.  Advised flu shot    Mammo and Dexa done 3/2024.      The  patient indicates understanding of these issues and agrees to the plan.  Return in about 6 months (around 4/1/2025) for MA supervisit, weight check.

## 2024-10-15 DIAGNOSIS — I10 ESSENTIAL HYPERTENSION: ICD-10-CM

## 2024-10-15 RX ORDER — VALSARTAN AND HYDROCHLOROTHIAZIDE 80; 12.5 MG/1; MG/1
1 TABLET, FILM COATED ORAL DAILY
Qty: 100 TABLET | Refills: 1 | Status: SHIPPED | OUTPATIENT
Start: 2024-10-15

## 2024-10-15 NOTE — TELEPHONE ENCOUNTER
Last office visit: 10/1/2024   Protocol: pass  Requested medication(s) are due for refill today: yes  Requested medication(s) are on the active medication list same strength, form, dose/ sig: yes  Requested medication(s) are managed by provider: yes  Patient has already received a courtsey refill: no    NOV: 4/21/2025  Last Labs: 9/26/2024  Asked to Return: 4/1/2025

## 2024-10-27 ENCOUNTER — OFFICE VISIT (OUTPATIENT)
Dept: FAMILY MEDICINE CLINIC | Facility: CLINIC | Age: 69
End: 2024-10-27
Payer: MEDICARE

## 2024-10-27 VITALS
WEIGHT: 200 LBS | HEIGHT: 63 IN | SYSTOLIC BLOOD PRESSURE: 120 MMHG | RESPIRATION RATE: 18 BRPM | DIASTOLIC BLOOD PRESSURE: 76 MMHG | BODY MASS INDEX: 35.44 KG/M2 | TEMPERATURE: 98 F | OXYGEN SATURATION: 99 % | HEART RATE: 66 BPM

## 2024-10-27 DIAGNOSIS — R39.9 UTI SYMPTOMS: Primary | ICD-10-CM

## 2024-10-27 DIAGNOSIS — R82.90 ABNORMAL URINALYSIS: ICD-10-CM

## 2024-10-27 LAB
APPEARANCE: CLEAR
BILIRUBIN: NEGATIVE
GLUCOSE (URINE DIPSTICK): NEGATIVE MG/DL
KETONES (URINE DIPSTICK): NEGATIVE MG/DL
MULTISTIX LOT#: ABNORMAL NUMERIC
NITRITE, URINE: NEGATIVE
PH, URINE: 6.5 (ref 4.5–8)
PROTEIN (URINE DIPSTICK): NEGATIVE MG/DL
SPECIFIC GRAVITY: 1.01 (ref 1–1.03)
URINE-COLOR: YELLOW
UROBILINOGEN,SEMI-QN: 0.2 MG/DL (ref 0–1.9)

## 2024-10-27 PROCEDURE — 1160F RVW MEDS BY RX/DR IN RCRD: CPT | Performed by: NURSE PRACTITIONER

## 2024-10-27 PROCEDURE — 87086 URINE CULTURE/COLONY COUNT: CPT | Performed by: NURSE PRACTITIONER

## 2024-10-27 PROCEDURE — 1159F MED LIST DOCD IN RCRD: CPT | Performed by: NURSE PRACTITIONER

## 2024-10-27 PROCEDURE — 81003 URINALYSIS AUTO W/O SCOPE: CPT | Performed by: NURSE PRACTITIONER

## 2024-10-27 PROCEDURE — 99213 OFFICE O/P EST LOW 20 MIN: CPT | Performed by: NURSE PRACTITIONER

## 2024-10-27 RX ORDER — NITROFURANTOIN 25; 75 MG/1; MG/1
100 CAPSULE ORAL 2 TIMES DAILY
Qty: 10 CAPSULE | Refills: 0 | Status: SHIPPED | OUTPATIENT
Start: 2024-10-27 | End: 2024-11-01

## 2024-10-27 NOTE — PROGRESS NOTES
Salma Singh, MSN, APRN, FNP-BC    HPI   Steffanie Nelson is a 69 year old female who presents to the clinic for Urinary Symptoms (Urgency and metallic taste in mouth . Started today /OTC: none )    Took some meds for cold, metallic taste in her mouth. Urinary urgency. Denies vaginal symptoms such as changes in discharge, itch, odor, and irritation.  Denies hematuria, flank pain, nausea, vomiting, fever, and chills. Denies abnormal vaginal bleeding and pelvic pain.    Decline STI testing today,     MEDICATIONS     Current Outpatient Medications   Medication Sig Dispense Refill    nitrofurantoin monohydrate macro 100 MG Oral Cap Take 1 capsule (100 mg total) by mouth 2 (two) times daily for 5 days. 10 capsule 0    valsartan-hydroCHLOROthiazide 80-12.5 MG Oral Tab TAKE ONE TABLET BY MOUTH ONCE DAILY 100 tablet 1    azelastine 0.1 % Nasal Solution 2 sprays by Nasal route 2 (two) times daily. 30 mL 2    clindamycin 1 % External Solution Apply 1 Application topically daily. To affected areas of scalp      ketoconazole 2 % External Shampoo Apply 10 mL topically twice a week.      ergocalciferol 1.25 MG (81204 UT) Oral Cap Take 1 capsule (50,000 Units total) by mouth once a week.      levothyroxine 50 MCG Oral Tab 1 tablet (50 mcg total) daily.      Cholecalciferol (VITAMIN D) 25 MCG (1000 UT) Oral Tab Take 1,000 Int'l Units by mouth daily.      Fiber-Vitamins-Minerals ([facility] FIBER GOOD) Oral Chew Tab Chew 2 tablets by mouth daily.      Doxylamine Succinate, Sleep, (UNISOM OR) Take 0.5 tablets by mouth as needed.           ALLERGIES   Allergies[1]    HISTORY     OB History    Para Term  AB Living   2 2           SAB IAB Ectopic Multiple Live Births                  # Outcome Date GA Lbr David/2nd Weight Sex Type Anes PTL Lv   2 Para            1 Para                Past Medical History:    Aortic aneurysm, thoracic (HCC)    ascending    Depression    Endocrine disorder    H/O mammogram     Hyperparathyroidism (HCC)    Hypertension    Hypothyroidism    Pneumonia, organism unspecified(486)    Routine gynecological examination    Sinusitis       Past Surgical History:   Procedure Laterality Date    Breast surgery procedure unlisted  2001    2 nodules in R breast removed    Colonoscopy  2007    Colonoscopy  2013    Dr. CHOWDARY -- due in 10 years    Cyst aspiration right Right 2003    Cyst aspiration right Right 2007    D & c  11/28/2011    Dual energy x-ray absorptiometry, body composition study, 1+ sites  11/12/2009    Hysterectomy  02/2013    no BSO, TVH    Other surgical history  01/12/2005    R parathyroid \"ectomy 1 lower R\"    Thyroid lobectomy,unilat Right 01/12/2005    right thyroid nodule removal    Vaginal hysterectomy, uterus >250 gms; w/removal, tube(s) &/or ovary(s) w/repair of enterocele  02/15/2013       Family History   Problem Relation Age of Onset    Other (angina pectoris) Mother 102    Other (covid) Mother     Other (alzheimer's disease) Father     Breast Cancer Sister 37        Late 30's. 2nd time 67    Other (stroke syndrome) Maternal Grandmother         brain aneurysm    Other (stroke syndrome) Other         mat uncle, brain aneurysm       Social History     Socioeconomic History    Marital status:      Spouse name: Not on file    Number of children: Not on file    Years of education: Not on file    Highest education level: Not on file   Occupational History    Occupation: [id number]     Comment: [employer]   Tobacco Use    Smoking status: Never    Smokeless tobacco: Never   Vaping Use    Vaping status: Never Used   Substance and Sexual Activity    Alcohol use: Yes     Comment: Social wine    Drug use: No    Sexual activity: Not on file   Other Topics Concern     Service Not Asked    Blood Transfusions Not Asked    Caffeine Concern Yes     Comment: 1 daily     Occupational Exposure Not Asked    Hobby Hazards Not Asked    Sleep Concern Not Asked    Stress Concern Not  Asked    Weight Concern Not Asked    Special Diet Not Asked    Back Care Not Asked    Exercise Yes     Comment: WALKING & aerobics class 2 x week    Bike Helmet Not Asked    Seat Belt Not Asked    Self-Exams Not Asked   Social History Narrative    Not on file     Social Drivers of Health     Financial Resource Strain: Not on file   Food Insecurity: Not on file   Transportation Needs: Not on file   Physical Activity: Not on file   Stress: Not on file   Social Connections: Not on file   Housing Stability: Not on file       ROS   Review of Systems   Constitutional:  Negative for chills and fever.   Genitourinary:  Positive for urgency.        PHYSICAL EXAM   /76 (BP Location: Left arm, Patient Position: Sitting, Cuff Size: adult)   Pulse 66   Temp 97.9 °F (36.6 °C) (Temporal)   Resp 18   Ht 5' 3\" (1.6 m)   Wt 200 lb (90.7 kg)   LMP 02/15/2011   SpO2 99%   BMI 35.43 kg/m²     Physical Exam  Constitutional:       General: She is not in acute distress.     Appearance: Normal appearance.   HENT:      Head: Normocephalic and atraumatic.      Nose: Nose normal.      Mouth/Throat:      Mouth: Mucous membranes are moist.   Eyes:      General: No scleral icterus.     Conjunctiva/sclera: Conjunctivae normal.   Cardiovascular:      Rate and Rhythm: Normal rate and regular rhythm.      Heart sounds: Normal heart sounds. No murmur heard.  Pulmonary:      Effort: Pulmonary effort is normal.      Breath sounds: Normal breath sounds.   Abdominal:      Tenderness: There is no right CVA tenderness or left CVA tenderness.   Neurological:      Mental Status: She is alert and oriented to person, place, and time.   Skin:     General: Skin is warm and dry.   Psychiatric:         Mood and Affect: Mood normal.         Behavior: Behavior normal.   Vitals and nursing note reviewed.           ASSESSMENT       ICD-10-CM    1. UTI symptoms  R39.9 Urine Culture, Routine     URINALYSIS, AUTO, W/O SCOPE      2. Abnormal urinalysis   R82.90 nitrofurantoin monohydrate macro 100 MG Oral Cap          PLAN   - Abnormal urine dip, will treat with antibiotics while culture is pending  - Will follow with results and recommendations  - Pt verbalized understanding and questions answered  - Follow up with PCP or OBGYN if symptoms persist  - If hematuria, flank pain, nausea, vomiting, fever and/or chills occur, report to the nearest emergency room for prompt evaluation    ORDERS     Orders Placed This Encounter   Procedures    URINALYSIS, AUTO, W/O SCOPE    Urine Culture, Routine       PRESCRIPTIONS     Requested Prescriptions     Signed Prescriptions Disp Refills    nitrofurantoin monohydrate macro 100 MG Oral Cap 10 capsule 0     Sig: Take 1 capsule (100 mg total) by mouth 2 (two) times daily for 5 days.       IMAGING/ REFERRALS   None     CHRISTINA Harding  10/27/2024  2:26 PM               [1]   Allergies  Allergen Reactions    Seasonal Runny nose

## 2024-10-29 ENCOUNTER — TELEPHONE (OUTPATIENT)
Dept: FAMILY MEDICINE CLINIC | Facility: CLINIC | Age: 69
End: 2024-10-29

## 2024-10-29 ENCOUNTER — OFFICE VISIT (OUTPATIENT)
Dept: SLEEP CENTER | Age: 69
End: 2024-10-29
Attending: INTERNAL MEDICINE
Payer: MEDICARE

## 2024-10-29 ENCOUNTER — PATIENT MESSAGE (OUTPATIENT)
Dept: FAMILY MEDICINE CLINIC | Facility: CLINIC | Age: 69
End: 2024-10-29

## 2024-10-29 DIAGNOSIS — E66.01 SEVERE OBESITY (BMI 35.0-39.9) WITH COMORBIDITY (HCC): Chronic | ICD-10-CM

## 2024-10-29 DIAGNOSIS — G47.19 DAYTIME HYPERSOMNOLENCE: ICD-10-CM

## 2024-10-29 DIAGNOSIS — G47.33 OSA (OBSTRUCTIVE SLEEP APNEA): ICD-10-CM

## 2024-10-29 PROCEDURE — 95811 POLYSOM 6/>YRS CPAP 4/> PARM: CPT

## 2024-10-29 NOTE — TELEPHONE ENCOUNTER
UTI symptoms went away, pt was advised to stop abx since urine culture was negative but she wanted Dr WHEELER's opinion if she should just finish course  Was prescribed nitrofurantoin 100 mg BID x5 days, would finish Friday morning     Pt reports feeling dizzy this afternoon, feels more tired than usual today  Feels steady on her feet  BP checked while on the phone - 120/86  Advised to make sure she stays hydrated    Recently had cold, has lingering cough

## 2024-10-29 NOTE — TELEPHONE ENCOUNTER
Have her finish the course since she is feeling better on the antibiotic.  Please remind to stay hydrated with fluids and electrolytes.  Follow-up if symptoms worsen.

## 2024-10-29 NOTE — TELEPHONE ENCOUNTER
Patient calling she went to urgent care on Sunday for uti they started her on antibiotics but her culture came back negative and now she is being instructed to stop the antibiotics but her symtopms are getting better and she would like to speak to a nurse

## 2024-11-01 ENCOUNTER — SLEEP STUDY (OUTPATIENT)
Facility: CLINIC | Age: 69
End: 2024-11-01
Payer: MEDICARE

## 2024-11-01 DIAGNOSIS — G47.33 OBSTRUCTIVE SLEEP APNEA: Primary | ICD-10-CM

## 2024-11-01 PROCEDURE — 95811 POLYSOM 6/>YRS CPAP 4/> PARM: CPT | Performed by: INTERNAL MEDICINE

## 2024-11-14 ENCOUNTER — TELEPHONE (OUTPATIENT)
Facility: CLINIC | Age: 69
End: 2024-11-14

## 2024-11-14 NOTE — TELEPHONE ENCOUNTER
----- Message from Paul Stack sent at 11/5/2024 10:13 PM CST -----  Please follow Sleep Study result recommendations    Thank You    Paul Stack MD

## 2025-01-03 NOTE — PROGRESS NOTES
[facility]  SLEEP PROGRESS NOTE        HPI:   This is a 69 year old female coming in for   Chief Complaint   Patient presents with    Obstructive Sleep Apnea (LANDY)     Pt here for . Pt states no real improvement regarding sleep.  Sleep questionnaire:  day    HPI:     She has tried multiple styles of masks- hybrid FFM, FFM (leaks, too close to eyes), nasal (mouth opens, bruising across her nose)  Went back to Resmed hybrid FFM x 3 days- seems to be working the best for her    Before CPAP-  Daytime sleepiness  Occasional AM headaches  Witnessed apnea  Snoring    After CPAP-  Sleeping longer  Less bathroom trips overnight  AM headaches improved  Unsure if she is still snoring  Not waking up gasping       In bed 930-10p  Reads before going to sleep  Out of bed 7-730a  SL varies - few minutes to a couple hours. Once a week- takes a couple hours  Nighttime awakenings not daily   Naps tries to avoid  Caffeine discontinues at 1pm, 2 glasses iced tea in 1AM, occ soda    Denies restless legs, dry mouth, tinnitus, chest pain, thoracic back pain, bloating, drowsy driving, sleep walking, sleep talking  Teeth grinding- mouth guard   Calf cramps about once weekly   Dry mouth - using humidifier - chamber is dry in AM     DME company: [employer]  Mask type: Resmed hybrid FFM       ESTRELLA COWART  2024 - 2025  Patient ID: [id number]  : 10/01/1955  Age: 69 years  [address]  [employer]  [address] [facility], [address]  Compliance Report  Usage 2024 - 2025  Usage days 44/44 days (100%)  >= 4 hours 37 days (84%)  < 4 hours 7 days (16%)  Usage hours 273 hours 44 minutes  Average usage (total days) 6 hours 13 minutes  Average usage (days used) 6 hours 13 minutes  Median usage (days used) 6 hours 13 minutes  Total used hours (value since last reset - 2025) 275 hours  AirSense 11 AutoSet  Serial number [id number]  Mode CPAP  Set  pressure 13 cmH2O  EPR Ramp Only  EPR level 2  Therapy  Leaks - L/min Median: 0.1 95th percentile: 9.2 Maximum: 29.2  Events per hour AI: 5.6 HI: 2.1 AHI: 7.7  Apnea Index Central: 4.3 Obstructive: 1.2 Unknown: 0.1  RERA Index 0.4  Cheyne-Hoyos respiration (average duration per night) 0 minutes (0%)  Patient: Sleep review of systems today: see form.      This is a 69 year old female who presents with the following symptoms, risk factors, behaviors or other items associated with sleep problems.    Sleep Apnea:   (Patient-Rptd) gasping/choking; wakes with dry mouth; mouth breathing; overweight; high blood pressure; age 65 or older; family member with sleep apnea; previous sleep study; current CPAP use  Insomnia:  (Patient-Rptd) difficulty falling asleep; difficulty staying asleep; waking too early  Restless Leg:  (Patient-Rptd) no symptoms or restless legs  Parasomnias:   (Patient-Rptd) teeth grinding  Daytime Problems:  (Patient-Rptd) sleepiness; memory problems; previous sleep study    The patient's Harlan Sleepiness score is (Patient-Rptd) 5/24.      7/15/2024 PSG  Respiratory Analysis: The Apnea-Hypopnea Index (AHI) was 30.1 events per hour. REM related AHI was 40.5 events per hour. Supine related AHI was 50.2. Lateral related AHI was 5.9. The Central Apnea Index was 0.5. The oxygen saturation john was 86% and patient spent 1.1% with saturations less than 88%.       Pt  PCP:  Ashley Wong DO  No referring provider defined for this encounter.           No data to display                    Past Medical History:    Aortic aneurysm, thoracic (HCC)    ascending    Depression    Endocrine disorder    H/O mammogram    Hyperparathyroidism (HCC)    Hypertension    Hypothyroidism    Pneumonia, organism unspecified(486)    Routine gynecological examination    Sinusitis     Past Surgical History:   Procedure Laterality Date    Breast surgery procedure unlisted  2001    2 nodules in R breast removed    Colonoscopy  2007     Colonoscopy  2013    Dr. CHOWDARY -- due in 10 years    Cyst aspiration right Right 2003    Cyst aspiration right Right 2007    D & c  11/28/2011    Dual energy x-ray absorptiometry, body composition study, 1+ sites  11/12/2009    Hysterectomy  02/2013    no BSO, TVH    Other surgical history  01/12/2005    R parathyroid \"ectomy 1 lower R\"    Thyroid lobectomy,unilat Right 01/12/2005    right thyroid nodule removal    Vaginal hysterectomy, uterus >250 gms; w/removal, tube(s) &/or ovary(s) w/repair of enterocele  02/15/2013     Social History:  Social History     Social History Narrative    Not on file     Social History     Socioeconomic History    Marital status:    Occupational History    Occupation: [employer]     Comment: [employer]   Tobacco Use    Smoking status: Never    Smokeless tobacco: Never   Vaping Use    Vaping status: Never Used   Substance and Sexual Activity    Alcohol use: Yes     Comment: Social wine    Drug use: No   Other Topics Concern    Caffeine Concern Yes     Comment: 1 daily     Exercise Yes     Comment: WALKING & aerobics class 2 x week     Family History:  Family History   Problem Relation Age of Onset    Other (angina pectoris) Mother 102    Other (covid) Mother     Other (alzheimer's disease) Father     Breast Cancer Sister 37        Late 30's. 2nd time 67    Other (stroke syndrome) Maternal Grandmother         brain aneurysm    Other (stroke syndrome) Other         mat uncle, brain aneurysm     Allergies:  Allergies[1]  Current Meds:  Current Outpatient Medications   Medication Sig Dispense Refill    valsartan-hydroCHLOROthiazide 80-12.5 MG Oral Tab TAKE ONE TABLET BY MOUTH ONCE DAILY 100 tablet 1    clindamycin 1 % External Solution Apply 1 Application topically daily. To affected areas of scalp      ketoconazole 2 % External Shampoo Apply 10 mL topically twice a week.      ergocalciferol 1.25 MG (30155 UT) Oral Cap Take 1 capsule (50,000 Units total) by mouth once a week.       levothyroxine 50 MCG Oral Tab 1 tablet (50 mcg total) daily.      Cholecalciferol (VITAMIN D) 25 MCG (1000 UT) Oral Tab Take 1,000 Int'l Units by mouth daily.      Fiber-Vitamins-Minerals (KNOX DAILY CARE FIBER GOOD) Oral Chew Tab Chew 2 tablets by mouth daily.      Doxylamine Succinate, Sleep, (UNISOM OR) Take 0.5 tablets by mouth as needed.          Counseling given: Not Answered         Problem List:  Patient Active Problem List   Diagnosis    Obesity, unspecified    Adhesive capsulitis of shoulder    Other specified menopausal and postmenopausal disorder    Benign neoplasm of cerebral meninges (HCC)    Vaginitis and vulvovaginitis, unspecified    Essential hypertension    Depression    Pure hyperglyceridemia    Reflux esophagitis    Hyperparathyroidism (HCC)    Hypothyroidism    Sepsis, unspecified    Abdominal pain    Dyslipidemia    Vitamin D deficiency    Thyroid dysfunction    Thyroid nodule    Laryngopharyngeal reflux    Parathyroid adenoma    LANDY (obstructive sleep apnea)- severe, AHI 30.1    Postoperative hypothyroidism    Multiple thyroid nodules    Thoracic aortic aneurysm without rupture, unspecified part (HCC)    Abnormal CT scan, heart    Current moderate episode of major depressive disorder without prior episode (HCC)    Severe obesity (BMI 35.0-39.9) with comorbidity (HCC)       REVIEW OF SYSTEMS:   Review of Systems  See HPI    EXAM:   /68   Pulse 76   Resp 16   Ht 5' 3\" (1.6 m)   Wt 205 lb (93 kg)   LMP 02/15/2011   SpO2 95%   BMI 36.31 kg/m²  Estimated body mass index is 36.31 kg/m² as calculated from the following:    Height as of this encounter: 5' 3\" (1.6 m).    Weight as of this encounter: 205 lb (93 kg).   Neck in inches:      Wt Readings from Last 6 Encounters:   01/06/25 205 lb (93 kg)   10/27/24 200 lb (90.7 kg)   10/01/24 205 lb (93 kg)   08/23/24 206 lb (93.4 kg)   08/22/24 205 lb (93 kg)   04/10/24 201 lb 6 oz (91.3 kg)     BP Readings from Last 3 Encounters:    01/06/25 110/68   10/27/24 120/76   10/01/24 118/70     Pulse Readings from Last 3 Encounters:   01/06/25 76   10/27/24 66   10/01/24 71     SpO2 Readings from Last 3 Encounters:   01/06/25 95%   10/27/24 99%   10/01/24 96%      Patient weight not recorded    Vital signs reviewed.  Physical Exam  Vitals and nursing note reviewed.   Constitutional:       Appearance: Normal appearance. She is obese.   HENT:      Head: Normocephalic and atraumatic.      Right Ear: External ear normal.      Left Ear: External ear normal.   Pulmonary:      Effort: Pulmonary effort is normal. No respiratory distress.   Musculoskeletal:      Cervical back: Normal range of motion and neck supple.   Neurological:      General: No focal deficit present.      Mental Status: She is alert and oriented to person, place, and time.   Psychiatric:         Attention and Perception: Attention and perception normal.         Mood and Affect: Mood and affect normal.         Speech: Speech normal.         Behavior: Behavior normal. Behavior is cooperative.         Thought Content: Thought content normal.         Cognition and Memory: Cognition and memory normal.         Judgment: Judgment normal.         ASSESSMENT AND PLAN:   1. LANDY (obstructive sleep apnea)- severe, AHI 30.1  - Obstructive events are down to 1.2/hr  - She is having central events at 4.3/hr, likely treatment emergent  - Continue current pressure 13cwp and current mask   - RTO in 3 months to follow up    2. Gastroesophageal reflux disease with esophagitis without hemorrhage  - Well controlled     3. Essential hypertension  - /68    4. Severe obesity (BMI 35.0-39.9) with comorbidity (HCC)  - Stable, continue to monitor       Patient Instructions   Try OTC magnesium oxide 400mg once daily to help with leg cramps    Dry mouth  - Try reducing humidity level on machine  - Try putting humidifier on your bedside    Independent interpretation of Sleep Download as defined above.  Continue  with Rx management of Sleep apnea with PAP therapy.    COMPLIANCE is required by insurance for 4 hours a night most nights of the week.    Advised if still with sleep apnea and not using CPAP has a 7 fold increase in risk of heart attack, stroke, abnormal heart rhythm  and death,  increased risk of driving accidents.     Advised to refrain from driving when sleepy.      Recommend weight loss, and maintain and optimal BMI with Exercise 30 minutes most days to target heart rate .     Advised patient to change filters,masks,hoses  and tubes and equiptment on a  regular schedule.    Filters and seals shall be changed every 1 month,  Hoses every 3 months,   CPAP mask and humidifier chamber changed every 6 month  with the durable medical equipment provider.         Meds & Refills for this Visit:  Requested Prescriptions      No prescriptions requested or ordered in this encounter       Outcome: Parent verbalizes understanding. Parent is notified to call with any questions, complications, allergies, or worsening or changing symptoms.  Parent is to call with any side effects or complications from the treatments as a result of today.     \" This note was created utilizing Dragon speech recognition software.  Please excuse any grammatical errors. Call my office if you have any questions regarding this note. \"     Kelsey PHAM MA  1/3/2025  1:26 PM         [1]   Allergies  Allergen Reactions    Seasonal Runny nose

## 2025-01-06 ENCOUNTER — OFFICE VISIT (OUTPATIENT)
Facility: CLINIC | Age: 70
End: 2025-01-06
Payer: MEDICARE

## 2025-01-06 VITALS
DIASTOLIC BLOOD PRESSURE: 68 MMHG | SYSTOLIC BLOOD PRESSURE: 110 MMHG | HEART RATE: 76 BPM | BODY MASS INDEX: 36.32 KG/M2 | WEIGHT: 205 LBS | OXYGEN SATURATION: 95 % | HEIGHT: 63 IN | RESPIRATION RATE: 16 BRPM

## 2025-01-06 DIAGNOSIS — G47.33 OSA (OBSTRUCTIVE SLEEP APNEA): Primary | ICD-10-CM

## 2025-01-06 DIAGNOSIS — K21.00 GASTROESOPHAGEAL REFLUX DISEASE WITH ESOPHAGITIS WITHOUT HEMORRHAGE: ICD-10-CM

## 2025-01-06 DIAGNOSIS — E66.01 SEVERE OBESITY (BMI 35.0-39.9) WITH COMORBIDITY (HCC): Chronic | ICD-10-CM

## 2025-01-06 DIAGNOSIS — I10 ESSENTIAL HYPERTENSION: ICD-10-CM

## 2025-01-06 PROCEDURE — 99214 OFFICE O/P EST MOD 30 MIN: CPT | Performed by: PHYSICIAN ASSISTANT

## 2025-01-06 PROCEDURE — 1160F RVW MEDS BY RX/DR IN RCRD: CPT | Performed by: PHYSICIAN ASSISTANT

## 2025-01-06 PROCEDURE — 1159F MED LIST DOCD IN RCRD: CPT | Performed by: PHYSICIAN ASSISTANT

## 2025-01-06 NOTE — PATIENT INSTRUCTIONS
Try OTC magnesium oxide 400mg once daily to help with leg cramps    Dry mouth  - Try reducing humidity level on machine  - Try putting humidifier on your bedside

## 2025-03-18 ENCOUNTER — PATIENT MESSAGE (OUTPATIENT)
Facility: CLINIC | Age: 70
End: 2025-03-18

## 2025-04-08 NOTE — PROGRESS NOTES
KASSIDYESTRELLA BRUCE  2025 - 2025  Patient ID: [id number]  : 10/01/1955  Age: 69 years  [address]  [employer]  [address] [facility], [id number]  Compliance Report  Usage 2025 - 2025  Usage days 90/90 days (100%)  >= 4 hours 90 days (100%)  < 4 hours 0 days (0%)  Usage hours 762 hours 7 minutes  Average usage (total days) 8 hours 28 minutes  Average usage (days used) 8 hours 28 minutes  Median usage (days used) 8 hours 31 minutes  Total used hours (value since last reset - 2025) 1,082 hours  AirSense 11 AutoSet  Serial number [id number]  Mode CPAP  Set pressure 13 cmH2O  EPR Ramp Only  EPR level 2  Therapy  Leaks - L/min Median: 0.5 95th percentile: 9.6 Maximum: 27.7  Events per hour AI: 5.6 HI: 2.6 AHI: 8.2  Apnea Index Central: 3.2 Obstructive: 2.2 Unknown: 0.1  RERA Index 0.8  Cheyne-Hoyos respiration (average duration per night) 1 minutes (0%)

## 2025-04-09 ENCOUNTER — OFFICE VISIT (OUTPATIENT)
Facility: CLINIC | Age: 70
End: 2025-04-09
Payer: MEDICARE

## 2025-04-09 VITALS
RESPIRATION RATE: 16 BRPM | HEART RATE: 72 BPM | OXYGEN SATURATION: 99 % | HEIGHT: 63 IN | WEIGHT: 217 LBS | BODY MASS INDEX: 38.45 KG/M2 | DIASTOLIC BLOOD PRESSURE: 64 MMHG | SYSTOLIC BLOOD PRESSURE: 108 MMHG

## 2025-04-09 DIAGNOSIS — E66.09 CLASS 2 OBESITY DUE TO EXCESS CALORIES WITHOUT SERIOUS COMORBIDITY WITH BODY MASS INDEX (BMI) OF 38.0 TO 38.9 IN ADULT: ICD-10-CM

## 2025-04-09 DIAGNOSIS — G47.10 HYPERSOMNIA: ICD-10-CM

## 2025-04-09 DIAGNOSIS — E66.812 CLASS 2 OBESITY DUE TO EXCESS CALORIES WITHOUT SERIOUS COMORBIDITY WITH BODY MASS INDEX (BMI) OF 38.0 TO 38.9 IN ADULT: ICD-10-CM

## 2025-04-09 DIAGNOSIS — G47.33 OSA (OBSTRUCTIVE SLEEP APNEA): Primary | ICD-10-CM

## 2025-04-09 PROCEDURE — 99214 OFFICE O/P EST MOD 30 MIN: CPT | Performed by: PHYSICIAN ASSISTANT

## 2025-04-09 PROCEDURE — 1159F MED LIST DOCD IN RCRD: CPT | Performed by: PHYSICIAN ASSISTANT

## 2025-04-09 PROCEDURE — 1160F RVW MEDS BY RX/DR IN RCRD: CPT | Performed by: PHYSICIAN ASSISTANT

## 2025-04-09 RX ORDER — CETIRIZINE HYDROCHLORIDE 10 MG/1
TABLET ORAL
COMMUNITY
Start: 2025-02-28

## 2025-04-09 NOTE — PATIENT INSTRUCTIONS
Try melatonin 3mg, up to 9mg nightly for sleep  You can decrease magnesium from 200mg to 100mg     Zepbound - weight loss medication for moderate-severe sleep apnea

## 2025-04-09 NOTE — PROGRESS NOTES
[facility] PULMONARY  SLEEP PROGRESS NOTE        ESTRELLA NELSON is a 69 year old female who presents today for 3 month f/u      Chief Complaint   Patient presents with    Obstructive Sleep Apnea (LANDY)     Pt here for three month check up.  Pt states continued issue with mask.  Sleep questionnaire: 8/24              The following individual(s) verbally consented to be recorded using ambient AI listening technology and understand that they can each withdraw their consent to this listening technology at any point by asking the clinician to turn off or pause the recording:    Patient name: Estrella Nelson  Additional names:  NA      History of Present Illness  The patient, with a history of sleep apnea, presents with ongoing issues related to her CPAP mask.     She initially used the F40 hybrid mask, but developed a sore from it. She then switched to the F20 mask, which resolved the sore. The patient has tried different masks, including the Josy mask and a mask liner, but continues to have issues. She reports that the mask tends to flutter against her skin, waking her up. She has also tried sleeping on her back more to alleviate this issue. The patient has been using the Josy since March 24th and reports that it has been \"pretty good\" overall, with no sores. She has also tried a smaller Josy mask which didn't work well. She has a Brooke Dreamwear mask, but has not tried it yet.     The patient also reports gaining a significant amount of weight in the past six months, which she finds puzzling as her eating habits have not changed significantly. She also reports an increase in bloating and gas, which she believes may be related to the CPAP machine.      She is up 15# in the last 6 months   Increased bloating and tinnitus    Dry throat - cough - improved with biotene nasal spray  Water chamber still has water in AM     Developed a sore under her nose with F40. Switched to F20 to allow welt to heal  Tried mask liner  - made the mask slide more  Switched to Josy on 3/24 - air fluttering on the sides  Tried a smaller Josy size - felt it obstructed her nose.   Just got a sample of chris dreamwear - has yet to try     [place] score:     She has tried multiple styles of masks- hybrid FFM, FFM (leaks, too close to eyes), nasal (mouth opens, bruising across her nose)  Went back to Resmed hybrid FFM x 3 days- seems to be working the best for her     Before CPAP-  Daytime sleepiness  Occasional AM headaches  Witnessed apnea  Snoring     After CPAP-  Sleeping longer  Less bathroom trips overnight  AM headaches improved  Unsure if she is still snoring  Not waking up gasping     In bed 930-10p  Reads before going to sleep  Out of bed 7-730a  SL varies - few minutes to a couple hours. Once a week- takes a couple hours  Nighttime awakenings not daily   Naps tries to avoid  Caffeine discontinues at 1pm, 2 glasses iced tea in AM, occ soda    Denies restless legs, dry mouth, tinnitus, chest pain, thoracic back pain, bloating, drowsy driving, sleep walking, sleep talking  Teeth grinding- mouth guard   Calf cramps improved with magnesium     DME company: [employer]  Mask type: Resmed hybrid FFM     SARAHY COWARTLIAM CABRERA  2025 - 2025  Patient ID: [id number]  : 10/01/1955  Age: 69 years  [address]  [employer]  [address] [facility], [address]  Compliance Report  Usage 2025 - 2025  Usage days 90/90 days (100%)  >= 4 hours 90 days (100%)  < 4 hours 0 days (0%)  Usage hours 762 hours 7 minutes  Average usage (total days) 8 hours 28 minutes  Average usage (days used) 8 hours 28 minutes  Median usage (days used) 8 hours 31 minutes  Total used hours (value since last reset - 2025) 1,082 hours  AirSense 11 AutoSet  Serial number [id number]  Mode CPAP  Set pressure 13 cmH2O  EPR Ramp Only  EPR level 2  Therapy  Leaks - L/min Median: 0.5 95th percentile: 9.6 Maximum:  27.7  Events per hour AI: 5.6 HI: 2.6 AHI: 8.2  Apnea Index Central: 3.2 Obstructive: 2.2 Unknown: 0.1  RERA Index 0.8  Cheyne-Hoyos respiration (average duration per night) 1 minutes (0%)       This is a 69 year old female who presents with the following symptoms, risk factors, behaviors or other items associated with sleep problems.    Sleep Apnea:   (Patient-Rptd) coughing; wakes with dry mouth; mouth breathing; recent weight gain; age 65 or older; family member with sleep apnea; previous sleep study; current CPAP use; current oral appliance use  Insomnia:  (Patient-Rptd) no symptoms of Insomnia  Restless Leg:  (Patient-Rptd) no symptoms or restless legs  Parasomnias:   (Patient-Rptd) teeth grinding  Daytime Problems:  (Patient-Rptd) sleepiness    The patient's [place] Sleepiness score is (Patient-Rptd) 8/24.    Patient: Sleep review of systems today: see form.      7/15/2024 PSG  Respiratory Analysis: The Apnea-Hypopnea Index (AHI) was 30.1 events per hour. REM related AHI was 40.5 events per hour. Supine related AHI was 50.2. Lateral related AHI was 5.9. The Central Apnea Index was 0.5. The oxygen saturation john was 86% and patient spent 1.1% with saturations less than 88%.       Pt  PCP:  Ashley Wong DO  No referring provider defined for this encounter.           No data to display                  Past Medical History[1]  Past Surgical History[2]  Social History:  Social History     Social History Narrative    Not on file     Short Social Hx on File[3]  Family History:  Family History[4]  Allergies:  Allergies[5]  Current Meds:  Current Medications[6]   Counseling given: Not Answered         Problem List:  Problem List[7]    REVIEW OF SYSTEMS:   Review of Systems  See HPI     EXAM:   /64   Pulse 72   Resp 16   Ht 5' 3\" (1.6 m)   Wt 217 lb (98.4 kg)   LMP 02/15/2011   SpO2 99%   BMI 38.44 kg/m²  Estimated body mass index is 38.44 kg/m² as calculated from the following:    Height as of this  encounter: 5' 3\" (1.6 m).    Weight as of this encounter: 217 lb (98.4 kg).   Neck in inches:      Wt Readings from Last 6 Encounters:   04/09/25 217 lb (98.4 kg)   01/06/25 205 lb (93 kg)   10/27/24 200 lb (90.7 kg)   10/01/24 205 lb (93 kg)   08/23/24 206 lb (93.4 kg)   08/22/24 205 lb (93 kg)     BP Readings from Last 3 Encounters:   04/09/25 108/64   01/06/25 110/68   10/27/24 120/76     Pulse Readings from Last 3 Encounters:   04/09/25 72   01/06/25 76   10/27/24 66     SpO2 Readings from Last 3 Encounters:   04/09/25 99%   01/06/25 95%   10/27/24 99%      Ideal body weight: 52.4 kg (115 lb 8.3 oz)  Adjusted ideal body weight: 70.8 kg (156 lb 1.8 oz)    Vital signs reviewed.  Physical Exam  Vitals and nursing note reviewed.   Constitutional:       Appearance: Normal appearance. She is obese.   HENT:      Head: Normocephalic and atraumatic.      Right Ear: External ear normal.      Left Ear: External ear normal.   Pulmonary:      Effort: Pulmonary effort is normal. No respiratory distress.   Musculoskeletal:      Cervical back: Normal range of motion and neck supple.   Neurological:      General: No focal deficit present.      Mental Status: She is alert and oriented to person, place, and time.   Psychiatric:         Attention and Perception: Attention and perception normal.         Mood and Affect: Mood and affect normal.         Speech: Speech normal.         Behavior: Behavior normal. Behavior is cooperative.         Thought Content: Thought content normal.         Cognition and Memory: Cognition and memory normal.         Judgment: Judgment normal.            Assessment & Plan  Obstructive Sleep Apnea (LANDY)  Issues with CPAP mask fit causing sores and discomfort. Bloating and weight gain possibly related to CPAP use. Current CPAP pressure may contribute to bloating.  - Adjust CPAP pressure to reduce bloating from CPAP 13cwp to APAP 11-15cwp  - Try Dreamwear mask for comfort and fit.  - Consider foam version  of F20 if other masks unsuitable.  - Discuss weight gain and potential weight loss medication with primary care provider.  - Repeat DL in 2-3 weeks and reassess  - RTO in 3 months    Patient is using and benefiting from regular cpap use.  Patient was instructed to clean equipment on a weekly basis.  Patient was instructed to keep up to date with supplies.  Patient was informed to avoid drowsy driving, or using heavy machinery.    Obesity BMI 38.44  15-pound weight gain over six months, possibly related to CPAP use or other conditions. Eligible for weight loss medication for sleep apnea.  - Discuss weight gain with primary care provider.  - Consider weight loss medication for sleep apnea if appropriate.    Hypersomnia  Increased drowsiness in late afternoon despite adequate sleep. Possible relation to CPAP use, weight gain, or dietary factors.  - Avoid naps to prevent nighttime sleep disruption.  - Consider dietary modifications to reduce afternoon fatigue.  - Reduce magnesium glycinate to 100 mg.  - Start melatonin at 3 mg, increase to 9 mg if needed.      F2F time: 23  Chart prep and closure: 12 min      Patient Instructions   Try melatonin 3mg, up to 9mg nightly for sleep  You can decrease magnesium from 200mg to 100mg     Zepbound - weight loss medication for moderate-severe sleep apnea    Independent interpretation of Sleep Download as defined above.  Continue with Rx management of Sleep apnea with PAP therapy.    COMPLIANCE is required by insurance for 4 hours a night most nights of the week.    Advised if still with sleep apnea and not using CPAP has a 7 fold increase in risk of heart attack, stroke, abnormal heart rhythm  and death,  increased risk of driving accidents.     Advised to refrain from driving when sleepy.      Recommend weight loss, and maintain and optimal BMI with Exercise 30 minutes most days to target heart rate .     Advised patient to change filters,masks,hoses  and tubes and equiptment on  a  regular schedule.    Filters and seals shall be changed every 1 month,  Hoses every 3 months,   CPAP mask and humidifier chamber changed every 6 month  with the durable medical equipment provider.         Meds & Refills for this Visit:  Requested Prescriptions      No prescriptions requested or ordered in this encounter       Outcome: Parent verbalizes understanding. Parent is notified to call with any questions, complications, allergies, or worsening or changing symptoms.  Parent is to call with any side effects or complications from the treatments as a result of today.     \" This note was created utilizing Dragon speech recognition software.  Please excuse any grammatical errors. Call my office if you have any questions regarding this note. \"     [place] Genao PA-C           [1]   Past Medical History:   Aortic aneurysm, thoracic    ascending    Depression    Endocrine disorder    H/O mammogram    Hyperparathyroidism (HCC)    Hypertension    Hypothyroidism    Pneumonia, organism unspecified(486)    Routine gynecological examination    Sinusitis   [2]   Past Surgical History:  Procedure Laterality Date    Breast surgery procedure unlisted  2001    2 nodules in R breast removed    Colonoscopy  2007    Colonoscopy  2013    Dr. CHOWDARY -- due in 10 years    Cyst aspiration right Right 2003    Cyst aspiration right Right 2007    D & c  11/28/2011    Dual energy x-ray absorptiometry, body composition study, 1+ sites  11/12/2009    Hysterectomy  02/2013    no BSO, TVH    Other surgical history  01/12/2005    R parathyroid \"ectomy 1 lower R\"    Thyroid lobectomy,unilat Right 01/12/2005    right thyroid nodule removal    Vaginal hysterectomy, uterus >250 gms; w/removal, tube(s) &/or ovary(s) w/repair of enterocele  02/15/2013   [3]   Social History  Socioeconomic History    Marital status:    Occupational History    Occupation: [employer]     Comment: [employer]   Tobacco Use    Smoking status: Never    Smokeless  tobacco: Never   Vaping Use    Vaping status: Never Used   Substance and Sexual Activity    Alcohol use: Yes     Comment: Social wine    Drug use: No   Other Topics Concern    Caffeine Concern Yes     Comment: 1 daily     Exercise Yes     Comment: WALKING & aerobics class 2 x week   [4]   Family History  Problem Relation Age of Onset    Other (angina pectoris) Mother 102    Other (covid) Mother     Other (alzheimer's disease) Father     Breast Cancer Sister 37        Late 30's. 2nd time 67    Other (stroke syndrome) Maternal Grandmother         brain aneurysm    Other (stroke syndrome) Other         mat uncle, brain aneurysm   [5]   Allergies  Allergen Reactions    Seasonal Runny nose   [6]   Current Outpatient Medications   Medication Sig Dispense Refill    cetirizine 10 MG Oral Tab       valsartan-hydroCHLOROthiazide 80-12.5 MG Oral Tab TAKE ONE TABLET BY MOUTH ONCE DAILY 100 tablet 1    clindamycin 1 % External Solution Apply 1 Application topically daily. To affected areas of scalp      ketoconazole 2 % External Shampoo Apply 10 mL topically twice a week.      ergocalciferol 1.25 MG (39963 UT) Oral Cap Take 1 capsule (50,000 Units total) by mouth once a week.      levothyroxine 50 MCG Oral Tab 1 tablet (50 mcg total) daily.      Cholecalciferol (VITAMIN D) 25 MCG (1000 UT) Oral Tab Take 1,000 Int'l Units by mouth daily.      Fiber-Vitamins-Minerals ([place] DAILY CARE FIBER GOOD) Oral Chew Tab Chew 2 tablets by mouth daily.      Doxylamine Succinate, Sleep, (UNISOM OR) Take 0.5 tablets by mouth as needed.       [7]   Patient Active Problem List  Diagnosis    Obesity, unspecified    Adhesive capsulitis of shoulder    Other specified menopausal and postmenopausal disorder    Benign neoplasm of cerebral meninges (HCC)    Vaginitis and vulvovaginitis, unspecified    Essential hypertension    Depression    Pure hyperglyceridemia    Reflux esophagitis    Hyperparathyroidism (HCC)    Hypothyroidism    Sepsis,  unspecified    Abdominal pain    Dyslipidemia    Vitamin D deficiency    Thyroid dysfunction    Thyroid nodule    Laryngopharyngeal reflux    Parathyroid adenoma    LANDY (obstructive sleep apnea)- severe, AHI 30.1    Postoperative hypothyroidism    Multiple thyroid nodules    Thoracic aortic aneurysm without rupture, unspecified part    Abnormal CT scan, heart    Current moderate episode of major depressive disorder without prior episode (HCC)    Class 2 obesity due to excess calories without serious comorbidity with body mass index (BMI) of 38.0 to 38.9 in adult

## 2025-04-14 ENCOUNTER — HOSPITAL ENCOUNTER (OUTPATIENT)
Dept: MAMMOGRAPHY | Age: 70
Discharge: HOME | End: 2025-04-14
Attending: FAMILY MEDICINE
Payer: MEDICARE

## 2025-04-14 DIAGNOSIS — Z12.31 ENCOUNTER FOR SCREENING MAMMOGRAM FOR MALIGNANT NEOPLASM OF BREAST: ICD-10-CM

## 2025-04-14 PROCEDURE — 77063 BREAST TOMOSYNTHESIS BI: CPT | Performed by: FAMILY MEDICINE

## 2025-04-14 PROCEDURE — 77067 SCR MAMMO BI INCL CAD: CPT | Performed by: FAMILY MEDICINE

## 2025-04-18 PROBLEM — E66.01 MORBID (SEVERE) OBESITY DUE TO EXCESS CALORIES (HCC): Status: RESOLVED | Noted: 2025-04-18 | Resolved: 2025-04-18

## 2025-04-20 PROBLEM — R93.1 ABNORMAL CT SCAN, HEART: Status: RESOLVED | Noted: 2022-10-05 | Resolved: 2025-04-20

## 2025-04-20 PROBLEM — I25.10 CORONARY ARTERY DISEASE INVOLVING NATIVE CORONARY ARTERY OF NATIVE HEART WITHOUT ANGINA PECTORIS: Status: ACTIVE | Noted: 2025-04-20

## 2025-04-21 ENCOUNTER — OFFICE VISIT (OUTPATIENT)
Dept: FAMILY MEDICINE CLINIC | Facility: CLINIC | Age: 70
End: 2025-04-21
Payer: MEDICARE

## 2025-04-21 VITALS
OXYGEN SATURATION: 96 % | RESPIRATION RATE: 18 BRPM | BODY MASS INDEX: 38.47 KG/M2 | DIASTOLIC BLOOD PRESSURE: 74 MMHG | HEIGHT: 63 IN | SYSTOLIC BLOOD PRESSURE: 122 MMHG | WEIGHT: 217.13 LBS | HEART RATE: 78 BPM

## 2025-04-21 DIAGNOSIS — Z90.710 SCREENING FOR MALIGNANT NEOPLASM OF VAGINA AFTER TOTAL HYSTERECTOMY: ICD-10-CM

## 2025-04-21 DIAGNOSIS — H93.13 TINNITUS OF BOTH EARS: ICD-10-CM

## 2025-04-21 DIAGNOSIS — E66.812 CLASS 2 OBESITY DUE TO EXCESS CALORIES WITHOUT SERIOUS COMORBIDITY WITH BODY MASS INDEX (BMI) OF 38.0 TO 38.9 IN ADULT: ICD-10-CM

## 2025-04-21 DIAGNOSIS — Z00.00 ENCOUNTER FOR ANNUAL HEALTH EXAMINATION: Primary | ICD-10-CM

## 2025-04-21 DIAGNOSIS — J30.2 SEASONAL ALLERGIES: ICD-10-CM

## 2025-04-21 DIAGNOSIS — I10 ESSENTIAL HYPERTENSION: ICD-10-CM

## 2025-04-21 DIAGNOSIS — E21.3 HYPERPARATHYROIDISM (HCC): ICD-10-CM

## 2025-04-21 DIAGNOSIS — D35.1 PARATHYROID ADENOMA: ICD-10-CM

## 2025-04-21 DIAGNOSIS — K21.00 GASTROESOPHAGEAL REFLUX DISEASE WITH ESOPHAGITIS WITHOUT HEMORRHAGE: ICD-10-CM

## 2025-04-21 DIAGNOSIS — G47.33 OSA (OBSTRUCTIVE SLEEP APNEA): ICD-10-CM

## 2025-04-21 DIAGNOSIS — D32.0 BENIGN NEOPLASM OF CEREBRAL MENINGES (HCC): ICD-10-CM

## 2025-04-21 DIAGNOSIS — E89.0 POSTOPERATIVE HYPOTHYROIDISM: ICD-10-CM

## 2025-04-21 DIAGNOSIS — E78.5 DYSLIPIDEMIA: ICD-10-CM

## 2025-04-21 DIAGNOSIS — Z79.899 MEDICATION MANAGEMENT: ICD-10-CM

## 2025-04-21 DIAGNOSIS — F32.1 CURRENT MODERATE EPISODE OF MAJOR DEPRESSIVE DISORDER WITHOUT PRIOR EPISODE (HCC): ICD-10-CM

## 2025-04-21 DIAGNOSIS — I71.20 THORACIC AORTIC ANEURYSM WITHOUT RUPTURE, UNSPECIFIED PART: ICD-10-CM

## 2025-04-21 DIAGNOSIS — Z12.72 SCREENING FOR MALIGNANT NEOPLASM OF VAGINA AFTER TOTAL HYSTERECTOMY: ICD-10-CM

## 2025-04-21 DIAGNOSIS — E04.2 MULTIPLE THYROID NODULES: ICD-10-CM

## 2025-04-21 DIAGNOSIS — E55.9 VITAMIN D DEFICIENCY: ICD-10-CM

## 2025-04-21 DIAGNOSIS — R73.9 HYPERGLYCEMIA: ICD-10-CM

## 2025-04-21 DIAGNOSIS — Z71.85 VACCINE COUNSELING: ICD-10-CM

## 2025-04-21 DIAGNOSIS — E66.09 CLASS 2 OBESITY DUE TO EXCESS CALORIES WITHOUT SERIOUS COMORBIDITY WITH BODY MASS INDEX (BMI) OF 38.0 TO 38.9 IN ADULT: ICD-10-CM

## 2025-04-21 DIAGNOSIS — Z12.11 SCREENING FOR COLON CANCER: ICD-10-CM

## 2025-04-21 PROCEDURE — 88175 CYTOPATH C/V AUTO FLUID REDO: CPT | Performed by: FAMILY MEDICINE

## 2025-04-21 RX ORDER — MAGNESIUM GLYCINATE 100 MG
CAPSULE ORAL
COMMUNITY

## 2025-04-21 RX ORDER — AZELASTINE 1 MG/ML
2 SPRAY, METERED NASAL 2 TIMES DAILY
COMMUNITY

## 2025-04-21 NOTE — H&P
Subjective:   Steffanie Nelson is a 69 year old female who presents for a MA AHA (Medicare Advantage Annual Health Assessment) and scheduled follow up of multiple significant but stable problems.   History of Present Illness  Steffanie Nelson is a 69 year old female who presents for a full physical exam.    She has been using a CPAP machine for her obstructive sleep apnea, which has improved her sleep but led to increased ear humming and worsened tinnitus. She has tried multiple masks but struggles with fit, causing discomfort and air leakage. Recent adjustments to the pressure settings have resulted in a sensation of suffocation at times. She also experiences gastrointestinal side effects, such as gas, from the CPAP. Despite sleeping more, she has had a few bad nights recently due to mask discomfort.    She has experienced significant weight gain of approximately 21-22 pounds since mid-last year, despite increased exercise. She denies stress eating and has not consulted a dietitian in many years. She is emotionally distressed over the weight gain and is unsure of the cause, despite maintaining or increasing her exercise routine. She has previously tried Weight Watchers with success but is hesitant about weight loss medications due to potential side effects.    She is currently using ExClear nasal spray and magnesium glycinate. She initially tried 200 mg of magnesium, which caused gastrointestinal upset, so she switched to a 100 mg gummy, which is better tolerated. She suspects her gastrointestinal issues may be related to her medications, including levothyroxine and blood pressure medication, which cause alternating constipation and diarrhea. She manages these symptoms with dietary adjustments like yogurt and oatmeal. She has tried probiotics in the past but found them disruptive.    She experiences headaches, which she attributes to sinus issues. She uses azelastine nasal spray for allergies but finds  it difficult to use twice daily due to a metallic taste, so she alternates it with ExClear spray. She has a history of acne treated with Accutane, which she believes affected her sinuses. Her headaches typically occur late in the day and resolve on their own.    She has a history of meningioma, with the last imaging done in 2016 showing calcification and no change. She has not had follow-up imaging since then due to the pandemic.    She completed a FIT test as part of a home health visit but has not received the results. She is overdue for a colonoscopy.    She reports that her right labia is significantly smaller, which she attributes to a past cyst, but she has no current concerns regarding this.    Last dental and eye exam -- 1/2025    History/Other:   Fall Risk Assessment:   She has been screened for Falls and is low risk.      Cognitive Assessment:   She had a completely normal cognitive assessment - see flowsheet entries       Functional Ability/Status:   Steffanie Nelson has some abnormal functions as listed below:  She has Hearing problems based on screening of functional status.She has problems with Memory based on screening of functional status.       Depression Screening (PHQ):  PHQ-2 SCORE: 0  , done 4/15/2025        10 minutes spent screening and counseling for depression    Advanced Directives:   She does have a Living Will but we do NOT have it on file in Epic.    She does have a POA but we do NOT have it on file in [employer].    Patient has Advance Care Planning documents but we do not have a copy in EMR. Discussed Advanced Care Planning with patient and instructed patient to get our office a copy to be scanned into EMR.      Problem List[1]  Allergies:  She is allergic to seasonal.    Current Medications:  Active Meds, Sig Only[2]    Medical History:  She  has a past medical history of Aortic aneurysm, thoracic, Depression, Endocrine disorder, H/O mammogram (03/10/2011), Hyperparathyroidism (HCC),  Hypertension, Hypothyroidism, Pneumonia, organism unspecified(486), Routine gynecological examination (11/24/2010), and Sinusitis.  Surgical History:  She  has a past surgical history that includes dual energy x-ray absorptiometry, body composition study, 1+ sites (11/12/2009); colonoscopy (2007); breast surgery procedure unlisted (2001); d & c (11/28/2011); other surgical history (01/12/2005); thyroid lobectomy,unilat (Right, 01/12/2005); vaginal hysterectomy, uterus >250 gms; w/removal, tube(s) &/or ovary(s) w/repair of enterocele (02/15/2013); cyst aspiration right (Right, 2003); cyst aspiration right (Right, 2007); colonoscopy (2013); and hysterectomy (02/2013).   Family History:  Her family history includes Breast Cancer (age of onset: 37) in her sister; alzheimer's disease in her father; angina pectoris (age of onset: 102) in her mother; covid in her mother; stroke syndrome in her maternal grandmother and another family member.  Social History:  She  reports that she has never smoked. She has never used smokeless tobacco. She reports current alcohol use. She reports that she does not use drugs.    Tobacco:  She has never smoked tobacco.    CAGE Alcohol Screen:   CAGE screening score of 0 on 4/15/2025, showing low risk of alcohol abuse.      Patient Care Team:  Ashley Wong DO as PCP - General (Family Practice)  Paul Stack MD (PULMONARY DISEASES)  [place] Genao PA-C (Physician Assistant)    Review of Systems  GENERAL: feels well otherwise  SKIN: denies any unusual skin lesions  EYES: denies blurred vision or double vision  HEENT: denies nasal congestion, sinus pain or ST  LUNGS: denies shortness of breath with exertion  CARDIOVASCULAR: denies chest pain on exertion  GI: denies abdominal pain, denies heartburn  : denies dysuria, vaginal discharge or itching, no complaint of urinary incontinence   MUSCULOSKELETAL: denies back pain  NEURO: denies headaches  PSYCHE: denies depression or  anxiety  HEMATOLOGIC: denies hx of anemia  ENDOCRINE: thyroid history  ALL/ASTHMA: denies hx of allergy or asthma    Objective:   Physical Exam  General Appearance:  Alert, cooperative, no distress, appears stated age   Head:  Normocephalic, without obvious abnormality, atraumatic   Eyes:  Conjunctiva/corneas clear, EOM's intact both eyes   Ears:  Normal TM's and external ear canals, both ears   Nose: Nares normal, septum midline,mucosa normal, no drainage or sinus tenderness   Throat: Lips, mucosa, and tongue normal; teeth and gums normal   Neck: Supple, symmetrical, trachea midline, no adenopathy;  thyroid: nodules; s/p right thyroidectomy; no carotid bruit or JVD   Back:   Symmetric, no curvature, ROM normal, no CVA tenderness   Lungs:   Clear to auscultation bilaterally, respirations unlabored   Heart:  Regular rate and rhythm, S1 and S2 normal, no murmur, rub, or gallop   Abdomen:   Soft, non-tender, bowel sounds active all four quadrants,  no masses, no organomegaly   Pelvic: See below   Extremities: Extremities normal, atraumatic, no cyanosis or edema   Pulses: 2+ and symmetric   Skin: Skin color, texture, turgor normal, no rashes or lesions   Lymph nodes: Cervical, supraclavicular, and axillary nodes normal   Neurologic: Normal   , Breasts:  normal appearance, no masses or tenderness,dense breasts and Pelvic: external genitalia normal, no adnexal masses or tenderness, rectovaginal septum normal, uterus surgically absent, vagina normal without discharge, and      /74 (BP Location: Left arm, Patient Position: Sitting, Cuff Size: adult)   Pulse 78   Resp 18   Ht 5' 3\" (1.6 m)   Wt 217 lb 2 oz (98.5 kg)   LMP 02/15/2011   SpO2 96%   BMI 38.46 kg/m²  Estimated body mass index is 38.46 kg/m² as calculated from the following:    Height as of this encounter: 5' 3\" (1.6 m).    Weight as of this encounter: 217 lb 2 oz (98.5 kg).    Medicare Hearing Assessment:   Hearing Screening    Time taken: 4/21/2025  10:11 AM  Entry User: Ruthie Olsen CMA  Screening Method: Finger Rub  Finger Rub Result: Pass         Visual Acuity:   Right Eye Visual Acuity: Corrected Right Eye Chart Acuity: 20/20   Left Eye Visual Acuity: Corrected Left Eye Chart Acuity: 20/20   Both Eyes Visual Acuity: Corrected Both Eyes Chart Acuity: 20/20   Able To Tolerate Visual Acuity: Yes        Assessment & Plan:   Steffanie Nelson is a 69 year old female who presents for a Medicare Assessment.     1. Encounter for annual health examination (Primary)  2. Benign neoplasm of cerebral meninges (HCC)  -     MRI BRAIN (W+WO) (CPT=70553); Future; Expected date: 04/21/2025  -     Detailed, Mod Complex (63273)  3. Hyperparathyroidism (HCC)  -     Detailed, Mod Complex (69251)  4. Essential hypertension  -     CBC With Differential With Platelet; Future; Expected date: 04/21/2025  -     Urinalysis with Culture Reflex; Future; Expected date: 04/21/2025  -     Detailed, Mod Complex (27201)  5. Dyslipidemia  -     Lipid Panel; Future; Expected date: 04/21/2025  -     Detailed, Mod Complex (67003)  6. Hyperglycemia  -     Comp Metabolic Panel (14); Future; Expected date: 04/21/2025  -     Hemoglobin A1C; Future; Expected date: 04/21/2025  -     Microalb/Creat Ratio, Random Urine; Future; Expected date: 04/21/2025  -     Detailed, Mod Complex (41063)  7. Parathyroid adenoma  -     Detailed, Mod Complex (26479)  8. Postoperative hypothyroidism  -     Detailed, Mod Complex (80202)  9. Multiple thyroid nodules  -     Detailed, Mod Complex (25316)  10. Vitamin D deficiency  -     Detailed, Mod Complex (48617)  11. Current moderate episode of major depressive disorder without prior episode (HCC)  -     Detailed, Mod Complex (88503)  12. LANDY (obstructive sleep apnea)  -     Detailed, Mod Complex (72261)  13. Seasonal allergies  -     Detailed, Mod Complex (59184)  14. Thoracic aortic aneurysm without rupture, unspecified part  -     Detailed, Mod Complex  (38771)  -     CTA CHEST (CPT=71275); Future; Expected date: 04/21/2025  15. Gastroesophageal reflux disease with esophagitis without hemorrhage  -     Detailed, Mod Complex (47787)  16. Class 2 obesity due to excess calories without serious comorbidity with body mass index (BMI) of 38.0 to 38.9 in adult  -     Detailed, Mod Complex (49237)  -     Referral to Nutritionist/Dietician  17. Body mass index (BMI) of 38.0-38.9 in adult  -     Detailed, Mod Complex (29529)  -     Face to Face Behavioral counseling on Obesity (BMI>30)  -     Referral to Nutritionist/Dietician  18. Medication management  -     Detailed, Mod Complex (74335)  19. Vaccine counseling  -     Detailed, Mod Complex (75993)  20. Screening for colon cancer  -     Detailed, Mod Complex (70594)  21. Screening for malignant neoplasm of vagina after total hysterectomy  -     ThinPrep PAP with HPV Reflex Request B; Future; Expected date: 04/21/2025  -     ThinPrep PAP with HPV Reflex Request B  22. Tinnitus of both ears    Assessment & Plan  Weight Gain  Significant weight gain from 193 lbs to 214-215 lbs over the past year. Thyroid function normal in January. Concerns about weight loss medications due to gastrointestinal issues. Discussed side effects of appetite suppressants like phentermine.  - Refer to dietitian for nutritional counseling.  - Enroll in weight loss clinic.  - Encourage food journaling and use of calorie tracking apps.  - Aim for a 6398-1043 calorie diet.  - Encourage regular exercise.  - Recheck thyroid function with blood work.    Obstructive Sleep Apnea  Using CPAP therapy with mask fit and pressure issues. CPAP increased sleep but caused ear humming and gastrointestinal discomfort.  - Continue working with equipment supplier to find a suitable CPAP mask.  - Follow up with Dr. Stack's PA in six months or sooner if issues persist.    Tinnitus  Worsening ear humming since starting CPAP therapy.    Gastrointestinal Issues  Alternating  constipation and diarrhea possibly related to medications and magnesium supplementation. Previous probiotic use caused adverse effects.  - Consider trying a different probiotic.  - Continue using fiber gummies and yogurt for digestive health.    Allergic Rhinitis  Using azelastine nasal spray with metallic taste and headaches. Discussed potential for azelastine to cause these symptoms.  - Hold azelastine nasal spray and monitor symptoms.  - Use ExClear nasal spray as needed.    Meningioma  Meningioma with last imaging in 2016 showing calcification and no change. Headaches noted, prompting need for updated imaging.  - Order MRI to assess meningioma status.    General Health Maintenance  Up to date on vaccinations. Due for a colonoscopy but completed a FIT test recently.  - Obtain results of recent FIT test for documentation.  - Schedule fasting blood work to check triglycerides and other parameters.    Follow-up  Emphasized the importance of follow-up to monitor progress and adjust treatment plans.  - Schedule follow-up appointment in 4-6 weeks for weight loss clinic.  - Ensure MRI is scheduled and completed.  - Follow up with blood work results.  The patient indicates understanding of these issues and agrees to the plan.  Reinforced healthy diet, lifestyle, and exercise.    1. Encounter for annual health examination  Done today.    2. Benign neoplasm of cerebral meninges (HCC)  Due for MRI brain.  Last MRI was in 2016.  - MRI BRAIN (W+WO) (CPT=70553); Future  - Detailed, Mod Complex (13573)    3. Hyperparathyroidism (HCC)  Stable; PTH wnl  - Detailed, Mod Complex (82293)    4. Essential hypertension  Stable on valsartan/HCTZ  - CBC With Differential With Platelet; Future  - Urinalysis with Culture Reflex; Future  - Detailed, Mod Complex (17908)    5. Dyslipidemia  Focus on diet and exercise  - Lipid Panel; Future  - Detailed, Mod Complex (34333)    6. Hyperglycemia  Stable; focus on diet and exercise  - Comp  Metabolic Panel (14); Future  - Hemoglobin A1C; Future  - Microalb/Creat Ratio, Random Urine; Future  - Detailed, Mod Complex (99214)    7. Parathyroid adenoma  Stable; PTH stable  - Detailed, Mod Complex (99214)    8. Postoperative hypothyroidism  Stable -- TSH And Free T4 stable  - Detailed, Mod Complex (99214)    9. Multiple thyroid nodules  Stable; in her left thyroid  - Detailed, Mod Complex (99214)    10. Vitamin D deficiency  Stable; CPM  - Detailed, Mod Complex (99214)    11. Current moderate episode of major depressive disorder without prior episode (HCC)  Mood stable; no suicidal thoughts  - Detailed, Mod Complex (99214)    12. ALNDY (obstructive sleep apnea)  Stable on CPAP per pulm.  - Detailed, Mod Complex (99214)    13. Seasonal allergies  Stable; on nasal sprays  - Detailed, Mod Complex (99214)    14. Thoracic aortic aneurysm without rupture, unspecified part  Stable; CT chest  - Detailed, Mod Complex (99214)    15. Gastroesophageal reflux disease with esophagitis without hemorrhage  Stable; CPM  - Detailed, Mod Complex (99214)    16. Class 2 obesity due to excess calories without serious comorbidity with body mass index (BMI) of 38.0 to 38.9 in adult  Focus on diet and exercise.  - Detailed, Mod Complex (99214)  - Refer to Nutritionist/Dietician - EDW    17. Body mass index (BMI) of 38.0-38.9 in adult  Focus on diet and exercise  - Detailed, Mod Complex (99214)  - Face to Face Behavioral counseling on Obesity (BMI>30)  - Refer to Nutritionist/Dietician - EDW    18. Medication management  Reviewed.  - Detailed, Mod Complex (99214)    19. Vaccine counseling  Reviewed.  - Detailed, Mod Complex (99214)    20. Screening for colon cancer  Did stool card with insurance company at home and will get me a copy.  - Detailed, Mod Complex (99214)    21. Screening for malignant neoplasm of vagina after total hysterectomy  Pap done.  - ThinPrep PAP with HPV Reflex Request; Future  - ThinPrep PAP with HPV Reflex  Request    22. Tinnitus of both ears  Stable; CPM    Other orders  - Magnesium Glycinate 100 MG Oral Cap; Take by mouth.  - azelastine 0.1 % Nasal Solution; 2 sprays by Nasal route in the morning and 2 sprays before bedtime.  - Sodium Chloride-Xylitol (XLEAR SINUS CARE SPRAY) Nasal Solution; by Nasal route.        Return in 6 weeks (on 6/2/2025) for OM-30.     Ashley Wong DO, 4/20/2025     Supplementary Documentation:   General Health:  In the past six months, have you lost more than 10 pounds without trying?: (Patient-Rptd) 2 - No  Has your appetite been poor?: (Patient-Rptd) No  Type of Diet: (Patient-Rptd) Balanced  How does the patient maintain a good energy level?: (Patient-Rptd) Other  How would you describe your daily physical activity?: (Patient-Rptd) Moderate  How would you describe your current health state?: (Patient-Rptd) Fair  How do you maintain positive mental well-being?: (Patient-Rptd) Social Interaction, Puzzles, Games, Visiting Friends, Visiting Family  On a scale of 0 to 10, with 0 being no pain and 10 being severe pain, what is your pain level?: (Patient-Rptd) 2 - (Mild)  In the past six months, have you experienced urine leakage?: (Patient-Rptd) 1-Yes  At any time do you feel concerned for the safety/well-being of yourself and/or your children, in your home or elsewhere?: (Patient-Rptd) No  Have you had any immunizations at another office such as Influenza, Hepatitis B, Tetanus, or Pneumococcal?: (Patient-Rptd) Yes    Health Maintenance   Topic Date Due    Colorectal Cancer Screening  07/12/2023    COVID-19 Vaccine (8 - 2024-25 season) 09/01/2024    Annual Well Visit  01/01/2025    Annual Depression Screening  01/01/2025    Influenza Vaccine (Season Ended) 10/01/2025    Mammogram  04/14/2026    DEXA Scan  Completed    Fall Risk Screening (Annual)  Completed    Pneumococcal Vaccine: 50+ Years  Completed    Zoster Vaccines  Completed    Meningococcal B Vaccine  Aged Out            [1]   Patient  Active Problem List  Diagnosis    Obesity, unspecified    Adhesive capsulitis of shoulder    Other specified menopausal and postmenopausal disorder    Benign neoplasm of cerebral meninges (HCC)    Vaginitis and vulvovaginitis, unspecified    Essential hypertension    Depression    Pure hyperglyceridemia    Reflux esophagitis    Hyperparathyroidism (HCC)    Hypothyroidism    Dyslipidemia    Vitamin D deficiency    Thyroid dysfunction    Thyroid nodule    Laryngopharyngeal reflux    Parathyroid adenoma    LANDY (obstructive sleep apnea)- severe, AHI 30.1    Postoperative hypothyroidism    Multiple thyroid nodules    Thoracic aortic aneurysm without rupture, unspecified part    Current moderate episode of major depressive disorder without prior episode (HCC)    Class 2 obesity due to excess calories without serious comorbidity with body mass index (BMI) of 38.0 to 38.9 in adult    Coronary artery disease involving native coronary artery of native heart without angina pectoris   [2]   Outpatient Medications Marked as Taking for the 4/21/25 encounter (Office Visit) with Ashley Wong DO   Medication Sig    Magnesium Glycinate 100 MG Oral Cap Take by mouth.    azelastine 0.1 % Nasal Solution 2 sprays by Nasal route in the morning and 2 sprays before bedtime.    Sodium Chloride-Xylitol (XLEAR SINUS CARE SPRAY) Nasal Solution by Nasal route.    cetirizine 10 MG Oral Tab     valsartan-hydroCHLOROthiazide 80-12.5 MG Oral Tab TAKE ONE TABLET BY MOUTH ONCE DAILY    clindamycin 1 % External Solution Apply 1 Application topically daily. To affected areas of scalp    ketoconazole 2 % External Shampoo Apply 10 mL topically twice a week.    ergocalciferol 1.25 MG (54655 UT) Oral Cap Take 1 capsule (50,000 Units total) by mouth once a week.    levothyroxine 50 MCG Oral Tab 1 tablet (50 mcg total) daily.    Cholecalciferol (VITAMIN D) 25 MCG (1000 UT) Oral Tab Take 1,000 Int'l Units by mouth daily.    Fiber-Vitamins-Minerals (KNOX  DAILY CARE FIBER GOOD) Oral Chew Tab Chew 2 tablets by mouth daily.    Doxylamine Succinate, Sleep, (UNISOM OR) Take 0.5 tablets by mouth as needed.

## 2025-04-21 NOTE — PROGRESS NOTES
Steffanie Nelson is a 69 year old female with a Body mass index is 38.46 kg/m².   HPI:   Steffanie Nelson was screened and found to have a BMI => 30, and is alert and competent for counseling on weight loss.    I performed a dietary (nutritional) assessment and Intensive behavioral counseling and behavioral therapy to promote sustained weight loss through high intensity interventions on diet and exercise.     ASSESSMENT AND PLAN:   Based on the 5A’s approach adopted by the [facility], the following plan is arranged:    Assess: We asked about factors affecting choice of behavior and assessed behavioral health risk of obesity.    Advise: We discussed clear and specific personalized plan for behavioral change including discussion about personal harm from her obesity and benefits of her losing weight.    Agree: Through a collaborative effort, we selected treatment goals of 2-3 based on Steffanie Nelson's interest and willingness to change.    Assist: We used behavior change techniques  Including self-help and counseling to aid in Steffanie Nelson achieving these agreed-upon goals by acquiring the skills, confidence, and social or environmental supports for behavior change, and we discussed possible supplementation with medical treatments.    Arrange: We discussed follow up for ongoing support in weight loss to support and adjust this treatment plan. We also discussed ability to go to the [facility] program for more intensive treatment.     Time Counseled: 15 min    Ashley Wong DO

## 2025-04-21 NOTE — PROGRESS NOTES
The following individual(s) verbally consented to be recorded using ambient AI listening technology and understand that they can each withdraw their consent to this listening technology at any point by asking the clinician to turn off or pause the recording:    Patient name: Steffanie Acosta Rodrigoolman  Additional names:  Sergio PAYNE Student

## 2025-04-28 ENCOUNTER — LAB ENCOUNTER (OUTPATIENT)
Dept: LAB | Age: 70
End: 2025-04-28
Attending: FAMILY MEDICINE
Payer: MEDICARE

## 2025-04-28 DIAGNOSIS — I10 ESSENTIAL HYPERTENSION: ICD-10-CM

## 2025-04-28 DIAGNOSIS — R73.9 HYPERGLYCEMIA: ICD-10-CM

## 2025-04-28 DIAGNOSIS — E78.5 DYSLIPIDEMIA: ICD-10-CM

## 2025-04-28 LAB
ALBUMIN SERPL-MCNC: 4.3 G/DL (ref 3.2–4.8)
ALBUMIN/GLOB SERPL: 1.9 {RATIO} (ref 1–2)
ALP LIVER SERPL-CCNC: 56 U/L (ref 55–142)
ALT SERPL-CCNC: 21 U/L (ref 10–49)
ANION GAP SERPL CALC-SCNC: 7 MMOL/L (ref 0–18)
AST SERPL-CCNC: 25 U/L (ref ?–34)
BASOPHILS # BLD AUTO: 0.03 X10(3) UL (ref 0–0.2)
BASOPHILS NFR BLD AUTO: 0.6 %
BILIRUB SERPL-MCNC: 0.5 MG/DL (ref 0.2–1.1)
BILIRUB UR QL STRIP.AUTO: NEGATIVE
BUN BLD-MCNC: 10 MG/DL (ref 9–23)
CALCIUM BLD-MCNC: 10.5 MG/DL (ref 8.7–10.6)
CHLORIDE SERPL-SCNC: 105 MMOL/L (ref 98–112)
CHOLEST SERPL-MCNC: 152 MG/DL (ref ?–200)
CLARITY UR REFRACT.AUTO: CLEAR
CO2 SERPL-SCNC: 26 MMOL/L (ref 21–32)
CREAT BLD-MCNC: 0.74 MG/DL (ref 0.55–1.02)
CREAT UR-SCNC: 51.6 MG/DL
EGFRCR SERPLBLD CKD-EPI 2021: 89 ML/MIN/1.73M2 (ref 60–?)
EOSINOPHIL # BLD AUTO: 0.16 X10(3) UL (ref 0–0.7)
EOSINOPHIL NFR BLD AUTO: 3.4 %
ERYTHROCYTE [DISTWIDTH] IN BLOOD BY AUTOMATED COUNT: 13.3 %
EST. AVERAGE GLUCOSE BLD GHB EST-MCNC: 114 MG/DL (ref 68–126)
FASTING PATIENT LIPID ANSWER: YES
FASTING STATUS PATIENT QL REPORTED: YES
GLOBULIN PLAS-MCNC: 2.3 G/DL (ref 2–3.5)
GLUCOSE BLD-MCNC: 93 MG/DL (ref 70–99)
GLUCOSE UR STRIP.AUTO-MCNC: NORMAL MG/DL
HBA1C MFR BLD: 5.6 % (ref ?–5.7)
HCT VFR BLD AUTO: 38.2 % (ref 35–48)
HDLC SERPL-MCNC: 57 MG/DL (ref 40–59)
HGB BLD-MCNC: 12.9 G/DL (ref 12–16)
IMM GRANULOCYTES # BLD AUTO: 0.02 X10(3) UL (ref 0–1)
IMM GRANULOCYTES NFR BLD: 0.4 %
KETONES UR STRIP.AUTO-MCNC: NEGATIVE MG/DL
LDLC SERPL CALC-MCNC: 77 MG/DL (ref ?–100)
LEUKOCYTE ESTERASE UR QL STRIP.AUTO: 25
LYMPHOCYTES # BLD AUTO: 1.58 X10(3) UL (ref 1–4)
LYMPHOCYTES NFR BLD AUTO: 34 %
MCH RBC QN AUTO: 29.6 PG (ref 26–34)
MCHC RBC AUTO-ENTMCNC: 33.8 G/DL (ref 31–37)
MCV RBC AUTO: 87.6 FL (ref 80–100)
MICROALBUMIN UR-MCNC: <0.3 MG/DL
MONOCYTES # BLD AUTO: 0.47 X10(3) UL (ref 0.1–1)
MONOCYTES NFR BLD AUTO: 10.1 %
NEUTROPHILS # BLD AUTO: 2.39 X10 (3) UL (ref 1.5–7.7)
NEUTROPHILS # BLD AUTO: 2.39 X10(3) UL (ref 1.5–7.7)
NEUTROPHILS NFR BLD AUTO: 51.5 %
NITRITE UR QL STRIP.AUTO: NEGATIVE
NONHDLC SERPL-MCNC: 95 MG/DL (ref ?–130)
OSMOLALITY SERPL CALC.SUM OF ELEC: 285 MOSM/KG (ref 275–295)
PH UR STRIP.AUTO: 6.5 [PH] (ref 5–8)
PLATELET # BLD AUTO: 239 10(3)UL (ref 150–450)
POTASSIUM SERPL-SCNC: 3.8 MMOL/L (ref 3.5–5.1)
PROT SERPL-MCNC: 6.6 G/DL (ref 5.7–8.2)
PROT UR STRIP.AUTO-MCNC: NEGATIVE MG/DL
RBC # BLD AUTO: 4.36 X10(6)UL (ref 3.8–5.3)
RBC UR QL AUTO: NEGATIVE
SODIUM SERPL-SCNC: 138 MMOL/L (ref 136–145)
SP GR UR STRIP.AUTO: 1.01 (ref 1–1.03)
TRIGL SERPL-MCNC: 98 MG/DL (ref 30–149)
UROBILINOGEN UR STRIP.AUTO-MCNC: NORMAL MG/DL
VLDLC SERPL CALC-MCNC: 15 MG/DL (ref 0–30)
WBC # BLD AUTO: 4.7 X10(3) UL (ref 4–11)

## 2025-04-28 PROCEDURE — 36415 COLL VENOUS BLD VENIPUNCTURE: CPT

## 2025-04-28 PROCEDURE — 85025 COMPLETE CBC W/AUTO DIFF WBC: CPT

## 2025-04-28 PROCEDURE — 87086 URINE CULTURE/COLONY COUNT: CPT

## 2025-04-28 PROCEDURE — 80053 COMPREHEN METABOLIC PANEL: CPT

## 2025-04-28 PROCEDURE — 82570 ASSAY OF URINE CREATININE: CPT

## 2025-04-28 PROCEDURE — 81001 URINALYSIS AUTO W/SCOPE: CPT

## 2025-04-28 PROCEDURE — 82043 UR ALBUMIN QUANTITATIVE: CPT

## 2025-04-28 PROCEDURE — 80061 LIPID PANEL: CPT

## 2025-04-28 PROCEDURE — 83036 HEMOGLOBIN GLYCOSYLATED A1C: CPT

## 2025-04-29 ENCOUNTER — PATIENT MESSAGE (OUTPATIENT)
Dept: FAMILY MEDICINE CLINIC | Facility: CLINIC | Age: 70
End: 2025-04-29

## 2025-05-01 DIAGNOSIS — I10 ESSENTIAL HYPERTENSION: ICD-10-CM

## 2025-05-01 RX ORDER — VALSARTAN AND HYDROCHLOROTHIAZIDE 80; 12.5 MG/1; MG/1
1 TABLET, FILM COATED ORAL DAILY
Qty: 100 TABLET | Refills: 0 | Status: SHIPPED | OUTPATIENT
Start: 2025-05-01

## 2025-05-01 NOTE — TELEPHONE ENCOUNTER
Last office visit: 5/21/25   Protocol: pass  Requested medication(s) are due for refill today: yes  Requested medication(s) are on the active medication list same strength, form, dose/ sig: yes  Requested medication(s) are managed by provider: yes  Patient has already received a courtsey refill: no    NOV: 6/4/25    Asked to Return: 6/2/25

## 2025-06-04 ENCOUNTER — OFFICE VISIT (OUTPATIENT)
Dept: FAMILY MEDICINE CLINIC | Facility: CLINIC | Age: 70
End: 2025-06-04
Payer: MEDICARE

## 2025-06-04 ENCOUNTER — TELEPHONE (OUTPATIENT)
Dept: FAMILY MEDICINE CLINIC | Facility: CLINIC | Age: 70
End: 2025-06-04

## 2025-06-04 VITALS
DIASTOLIC BLOOD PRESSURE: 80 MMHG | HEART RATE: 66 BPM | OXYGEN SATURATION: 97 % | HEIGHT: 63 IN | SYSTOLIC BLOOD PRESSURE: 116 MMHG | WEIGHT: 211.5 LBS | BODY MASS INDEX: 37.47 KG/M2 | RESPIRATION RATE: 16 BRPM

## 2025-06-04 DIAGNOSIS — I10 ESSENTIAL HYPERTENSION: ICD-10-CM

## 2025-06-04 DIAGNOSIS — Z76.89 ENCOUNTER FOR WEIGHT MANAGEMENT: ICD-10-CM

## 2025-06-04 DIAGNOSIS — Z71.82 EXERCISE COUNSELING: ICD-10-CM

## 2025-06-04 DIAGNOSIS — E66.812 CLASS 2 OBESITY DUE TO EXCESS CALORIES WITHOUT SERIOUS COMORBIDITY WITH BODY MASS INDEX (BMI) OF 37.0 TO 37.9 IN ADULT: Primary | ICD-10-CM

## 2025-06-04 DIAGNOSIS — Z79.899 MEDICATION MANAGEMENT: ICD-10-CM

## 2025-06-04 DIAGNOSIS — R73.9 HYPERGLYCEMIA: ICD-10-CM

## 2025-06-04 DIAGNOSIS — F40.240 CLAUSTROPHOBIA: ICD-10-CM

## 2025-06-04 DIAGNOSIS — E66.09 CLASS 2 OBESITY DUE TO EXCESS CALORIES WITHOUT SERIOUS COMORBIDITY WITH BODY MASS INDEX (BMI) OF 37.0 TO 37.9 IN ADULT: Primary | ICD-10-CM

## 2025-06-04 DIAGNOSIS — Z71.3 DIETARY COUNSELING: ICD-10-CM

## 2025-06-04 PROCEDURE — 99214 OFFICE O/P EST MOD 30 MIN: CPT | Performed by: FAMILY MEDICINE

## 2025-06-04 PROCEDURE — 1160F RVW MEDS BY RX/DR IN RCRD: CPT | Performed by: FAMILY MEDICINE

## 2025-06-04 PROCEDURE — 1170F FXNL STATUS ASSESSED: CPT | Performed by: FAMILY MEDICINE

## 2025-06-04 PROCEDURE — 1159F MED LIST DOCD IN RCRD: CPT | Performed by: FAMILY MEDICINE

## 2025-06-04 RX ORDER — DIAZEPAM 5 MG/1
5 TABLET ORAL
Qty: 2 TABLET | Refills: 0 | Status: SHIPPED | OUTPATIENT
Start: 2025-06-04

## 2025-06-04 NOTE — PROGRESS NOTES
Food Journal  Reviewed and Discussed:          Patient has a Food Journal?: No  Patient is reading nutrition labels?  No  Average Caloric Intake: unknown         Average CHO Intake: unknown   Is patient exercising?  yes  Type of exercise? Walking and gardening      Eating Habits  Patient states the following:  Meals a day? 3  Length of time it takes to consume a meal: 20 min   # of snacks per day: 1       Type of snacks:  fruit, nuts and chips  Amount of soda consumption per day: once a week   Amount of water (in ounces) per day:  60 oz   Drinking between meals only: yes   Toughest challenge: moderation         The following individual(s) verbally consented to be recorded using ambient AI listening technology and understand that they can each withdraw their consent to this listening technology at any point by asking the clinician to turn off or pause the recording:    Patient name: Steffanie Acosta Omar

## 2025-06-04 NOTE — PROGRESS NOTES
[facility], [address] [address]  Dept: [phone]       Patient:  Steffanie Nelson  :      10/1/1955  MRN:      [id number]    Chief Complaint:    Chief Complaint   Patient presents with    Weight Loss     1st OM        SUBJECTIVE     History of Present Illness:  Steffanie is being seen today for a follow-up for weight    History of Present Illness  Steffanie Nelson is a 69 year old female who presents for a weight loss check.    She has successfully lost six pounds through dietary moderation and increased vegetable intake, including making large salads to consume throughout the week. Her weight can fluctuate by up to three pounds daily. She is interested in understanding carbohydrates better, particularly in relation to nutrition labels, to improve her dietary choices.    She describes a hectic schedule, including a two-week trip to [place] for her granddaughter's dance competition, which involved frequent dining out. She prefers home-cooked meals over restaurant food after this experience. She is planning another trip to [place] to visit her daughter and grandchildren, which she anticipates will be busy due to her daughter's four children, aged between five and fourteen.    She has been actively gardening, which she describes as intense and physically demanding, leading to joint aches and pains. She has consulted with a dietitian, Esmer Charles, but found her to be out of network, which would incur a significant cost. She has previously encountered similar issues with insurance coverage for other specialists.    She mentions a history of parathyroid issues and a strong motivation to avoid kidney stones, which influences her hydration habits. She reports consuming more than sixty ounces of water daily, supplemented by occasional seltzer and iced tea.    She has a history of a thoracic aorta aneurysm, which has  remained stable for two years, and trivial calcifications on the transverse aorta. No coronary artery disease, as her heart scan did not show any significant issues.    She is planning to schedule an MRI for her meningiomas and notes a history of claustrophobia, which has worsened over time, requiring Valium for previous MRIs. She describes increasing anxiety with age, particularly in crowded spaces, which contrasts with her 's sociable nature.        Past Medical History: Past Medical History[1]       OBJECTIVE     Vitals: /80   Pulse 66   Resp 16   Ht 5' 3\" (1.6 m)   Wt 211 lb 8 oz (95.9 kg)   LMP 02/15/2011   SpO2 97%   BMI 37.47 kg/m²     Initial weight loss: 6   Total weight loss: 6    Start weight: 217    Wt Readings from Last 3 Encounters:   06/04/25 211 lb 8 oz (95.9 kg)   04/21/25 217 lb 2 oz (98.5 kg)   04/09/25 217 lb (98.4 kg)       Patient Medications:  Current Medications[2]  Allergies:  Seasonal     Social History:    Social History     Socioeconomic History    Marital status:      Spouse name: Not on file    Number of children: Not on file    Years of education: Not on file    Highest education level: Not on file   Occupational History    Occupation: [employer]     Comment: [employer]   Tobacco Use    Smoking status: Never    Smokeless tobacco: Never   Vaping Use    Vaping status: Never Used   Substance and Sexual Activity    Alcohol use: Yes     Comment: Social wine    Drug use: No    Sexual activity: Not on file   Other Topics Concern     Service Not Asked    Blood Transfusions Not Asked    Caffeine Concern Yes     Comment: 1 daily     Occupational Exposure Not Asked    Hobby Hazards Not Asked    Sleep Concern Not Asked    Stress Concern Not Asked    Weight Concern Not Asked    Special Diet Not Asked    Back Care Not Asked    Exercise Yes     Comment: WALKING & aerobics class 2 x week    Bike Helmet Not Asked    Seat Belt Not Asked    Self-Exams Not Asked    Social History Narrative    Not on file     Social Drivers of Health     Food Insecurity: No Food Insecurity (4/21/2025)    NCSS - Food Insecurity     Worried About Running Out of Food in the Last Year: No     Ran Out of Food in the Last Year: No   Transportation Needs: No Transportation Needs (4/21/2025)    NCSS - Transportation     Lack of Transportation: No   Stress: Not on file   Housing Stability: Not At Risk (4/21/2025)    NCSS - Housing/Utilities     Has Housing: Yes     Worried About Losing Housing: No     Unable to Get Utilities: No     Surgical History:  Past Surgical History[3]  Family History:  Family History[4]    Food Journal  Reviewed and Discussed:          Patient has a Food Journal?: No  Patient is reading nutrition labels?  No  Average Caloric Intake: unknown         Average CHO Intake: unknown   Is patient exercising?  yes  Type of exercise? Walking and gardening      Eating Habits  Patient states the following:  Meals a day? 3  Length of time it takes to consume a meal: 20 min   # of snacks per day: 1       Type of snacks:  fruit, nuts and chips  Amount of soda consumption per day: once a week   Amount of water (in ounces) per day:  60 oz   Drinking between meals only: yes   Toughest challenge: moderation          The following individual(s) verbally consented to be recorded using ambient AI listening technology and understand that they can each withdraw their consent to this listening technology at any point by asking the clinician to turn off or pause the recording:    Patient name: Steffanie Nelson          ROS:    Pertinent items are noted in HPI.  A comprehensive review of systems was negative.  All other systems were reviewed and are negative    Physical Exam:   /80   Pulse 66   Resp 16   Ht 5' 3\" (1.6 m)   Wt 211 lb 8 oz (95.9 kg)   LMP 02/15/2011   SpO2 97%   BMI 37.47 kg/m²   General appearance: alert, appears stated age, and cooperative  Head: Normocephalic,  without obvious abnormality, atraumatic  Neck: no adenopathy, no carotid bruit, no JVD, supple, symmetrical, trachea midline, and thyroid not enlarged, symmetric, no tenderness/mass/nodules  Lungs: clear to auscultation bilaterally  Heart: S1, S2 normal, no murmur, click, rub or gallop, regular rate and rhythm  Abdomen: soft, non-tender; bowel sounds normal; no masses,  no organomegaly  Extremities: extremities normal, atraumatic, no cyanosis or edema  Skin: Skin color, texture, turgor normal. No rashes or lesions    ASSESSMENT/PLAN     Encounter Diagnoses   Name Primary?    Class 2 obesity due to excess calories without serious comorbidity with body mass index (BMI) of 37.0 to 37.9 in adult Yes    Encounter for weight management     Dietary counseling     Exercise counseling     Hyperglycemia     Essential hypertension     Medication management     Claustrophobia        Orders Placed This Encounter   Procedures    Comp Metabolic Panel (14)    Hemoglobin A1C       Nutritional Goals  Calorie-controlled diet:  3304-1469    Behavior Modifications Reviewed and Discussed  Eat 3 meals per day, Plan meals in advance, Drink 64 oz of water per day, Utlize portion control strategies to reduce calorie intake, Identify triggers for eating and manage cues, and Eat slowly and take 20 to 30 minutes to complete each meal    Exercise Goals Reviewed and Discussed    Walk for  30 Minutes      Meds & Refills for this Visit:  Requested Prescriptions     Signed Prescriptions Disp Refills    diazePAM (VALIUM) 5 MG Oral Tab 2 tablet 0     Sig: Take 1 tablet (5 mg total) by mouth daily as needed for Anxiety. Take 1 hour prior to procedure and repeat if needed.       Imaging & Consults:  None    Assessment & Plan  Weight Management  She lost six pounds through dietary moderation. Goal: 2-4 pounds/month weight loss with 6788-5377 calories/day. Interested in understanding carbohydrates.  - Provide educational materials on macro types and  carbohydrate tolerance.  - Encourage food journaling for calorie counting.  - Advise a target caloric intake of 3915-8072 calories per day.  - Encourage reading nutrition labels and reducing intake of high-carb foods like pasta, bread, and rice.  - Advise a minimum of 150 minutes of exercise per week, focusing on brisk walking.  - Encourage drinking at least 64 ounces of water daily.    Parathyroid Issues  She understands the importance of hydration to prevent kidney stones.  - Encourage drinking at least 64 ounces of water daily.    Thoracic Aortic Aneurysm  Well-managed for two years without significant change.    Meningiomas  Due for MRI to monitor meningiomas. Experiences claustrophobia during MRIs, managed with Valium.  - Order MRI for brain to monitor meningiomas.  - Prescribe Valium 5 mg, two pills, to be taken one hour prior to the MRI procedure, with the option to take a second pill if needed.          Return in about 4 weeks (around 7/2/2025) for OM -15.           [1]   Past Medical History:   Aortic aneurysm, thoracic    ascending    Depression    Endocrine disorder    H/O mammogram    Hyperparathyroidism (HCC)    Hypertension    Hypothyroidism    Pneumonia, organism unspecified(486)    Routine gynecological examination    Sinusitis   [2]   Current Outpatient Medications   Medication Sig Dispense Refill    diazePAM (VALIUM) 5 MG Oral Tab Take 1 tablet (5 mg total) by mouth daily as needed for Anxiety. Take 1 hour prior to procedure and repeat if needed. 2 tablet 0    VALSARTAN-HYDROCHLOROTHIAZIDE 80-12.5 MG Oral Tab TAKE ONE TABLET BY MOUTH ONCE DAILY 100 tablet 0    Magnesium Glycinate 100 MG Oral Cap Take by mouth.      Sodium Chloride-Xylitol (XLEAR SINUS CARE SPRAY) Nasal Solution by Nasal route.      cetirizine 10 MG Oral Tab       clindamycin 1 % External Solution Apply 1 Application topically daily. To affected areas of scalp      ketoconazole 2 % External Shampoo Apply 10 mL topically twice a  week.      ergocalciferol 1.25 MG (25299 UT) Oral Cap Take 1 capsule (50,000 Units total) by mouth once a week.      levothyroxine 50 MCG Oral Tab 1 tablet (50 mcg total) daily.      Cholecalciferol (VITAMIN D) 25 MCG (1000 UT) Oral Tab Take 1,000 Int'l Units by mouth daily.      Fiber-Vitamins-Minerals (Louann DAILY [facility] FIBER GOOD) Oral Chew Tab Chew 2 tablets by mouth daily.      Doxylamine Succinate, Sleep, (UNISOM OR) Take 0.5 tablets by mouth as needed.        azelastine 0.1 % Nasal Solution 2 sprays by Nasal route in the morning and 2 sprays before bedtime. (Patient not taking: Reported on 6/4/2025)     [3]   Past Surgical History:  Procedure Laterality Date    Breast surgery procedure unlisted  2001    2 nodules in R breast removed    Colonoscopy  2007    Colonoscopy  2013    Dr. CHOWDARY -- due in 10 years    Cyst aspiration right Right 2003    Cyst aspiration right Right 2007    D & c  11/28/2011    Dual energy x-ray absorptiometry, body composition study, 1+ sites  11/12/2009    Hysterectomy  02/2013    no BSO, TVH    Other surgical history  01/12/2005    R parathyroid \"ectomy 1 lower R\"    Thyroid lobectomy,unilat Right 01/12/2005    right thyroid nodule removal    Vaginal hysterectomy, uterus >250 gms; w/removal, tube(s) &/or ovary(s) w/repair of enterocele  02/15/2013   [4]   Family History  Problem Relation Age of Onset    Other (angina pectoris) Mother 102    Other (covid) Mother     Other (alzheimer's disease) Father     Breast Cancer Sister 37        Late 30's. 2nd time 67    Other (stroke syndrome) Maternal Grandmother         brain aneurysm    Other (stroke syndrome) Other         mat uncle, brain aneurysm

## 2025-06-30 ENCOUNTER — LAB ENCOUNTER (OUTPATIENT)
Dept: LAB | Age: 70
End: 2025-06-30
Attending: FAMILY MEDICINE
Payer: MEDICARE

## 2025-06-30 DIAGNOSIS — E04.2 NONTOXIC MULTINODULAR GOITER: ICD-10-CM

## 2025-06-30 DIAGNOSIS — Z90.89 MYASTHENIA GRAVIS STATUS POST THYMECTOMY (HCC): ICD-10-CM

## 2025-06-30 DIAGNOSIS — E89.0 POSTSURGICAL HYPOTHYROIDISM: Primary | ICD-10-CM

## 2025-06-30 DIAGNOSIS — E55.9 AVITAMINOSIS D: ICD-10-CM

## 2025-06-30 DIAGNOSIS — Z98.890 PERSONAL HISTORY OF SURGERY TO HEART AND GREAT VESSELS, PRESENTING HAZARDS TO HEALTH: ICD-10-CM

## 2025-06-30 DIAGNOSIS — G70.00 MYASTHENIA GRAVIS STATUS POST THYMECTOMY (HCC): ICD-10-CM

## 2025-06-30 LAB
CALCIUM BLD-MCNC: 10.4 MG/DL (ref 8.7–10.6)
PTH-INTACT SERPL-MCNC: 75.9 PG/ML (ref 18.5–88)
T4 FREE SERPL-MCNC: 1.3 NG/DL (ref 0.8–1.7)
TSI SER-ACNC: 0.49 UIU/ML (ref 0.55–4.78)
VIT D+METAB SERPL-MCNC: 109.6 NG/ML (ref 30–100)

## 2025-06-30 PROCEDURE — 84439 ASSAY OF FREE THYROXINE: CPT

## 2025-06-30 PROCEDURE — 84443 ASSAY THYROID STIM HORMONE: CPT

## 2025-06-30 PROCEDURE — 36415 COLL VENOUS BLD VENIPUNCTURE: CPT

## 2025-06-30 PROCEDURE — 82306 VITAMIN D 25 HYDROXY: CPT

## 2025-06-30 PROCEDURE — 82310 ASSAY OF CALCIUM: CPT

## 2025-06-30 PROCEDURE — 82330 ASSAY OF CALCIUM: CPT

## 2025-06-30 PROCEDURE — 83970 ASSAY OF PARATHORMONE: CPT

## 2025-07-01 LAB — LC CALCIUM, IONIZED: 5.7 MG/DL

## 2025-07-09 ENCOUNTER — OFFICE VISIT (OUTPATIENT)
Dept: FAMILY MEDICINE CLINIC | Facility: CLINIC | Age: 70
End: 2025-07-09
Payer: MEDICARE

## 2025-07-09 ENCOUNTER — OFFICE VISIT (OUTPATIENT)
Facility: CLINIC | Age: 70
End: 2025-07-09
Payer: MEDICARE

## 2025-07-09 VITALS
HEIGHT: 63 IN | WEIGHT: 204 LBS | RESPIRATION RATE: 16 BRPM | SYSTOLIC BLOOD PRESSURE: 118 MMHG | HEART RATE: 62 BPM | BODY MASS INDEX: 36.14 KG/M2 | OXYGEN SATURATION: 99 % | DIASTOLIC BLOOD PRESSURE: 70 MMHG

## 2025-07-09 VITALS
DIASTOLIC BLOOD PRESSURE: 70 MMHG | SYSTOLIC BLOOD PRESSURE: 118 MMHG | HEIGHT: 63 IN | OXYGEN SATURATION: 97 % | WEIGHT: 207.13 LBS | BODY MASS INDEX: 36.7 KG/M2 | HEART RATE: 59 BPM | RESPIRATION RATE: 18 BRPM

## 2025-07-09 DIAGNOSIS — E66.09 CLASS 2 OBESITY DUE TO EXCESS CALORIES WITHOUT SERIOUS COMORBIDITY WITH BODY MASS INDEX (BMI) OF 36.0 TO 36.9 IN ADULT: Primary | ICD-10-CM

## 2025-07-09 DIAGNOSIS — E66.09 CLASS 2 OBESITY DUE TO EXCESS CALORIES WITHOUT SERIOUS COMORBIDITY WITH BODY MASS INDEX (BMI) OF 36.0 TO 36.9 IN ADULT: ICD-10-CM

## 2025-07-09 DIAGNOSIS — Z71.82 EXERCISE COUNSELING: ICD-10-CM

## 2025-07-09 DIAGNOSIS — Z79.899 MEDICATION MANAGEMENT: ICD-10-CM

## 2025-07-09 DIAGNOSIS — E66.812 CLASS 2 OBESITY DUE TO EXCESS CALORIES WITHOUT SERIOUS COMORBIDITY WITH BODY MASS INDEX (BMI) OF 36.0 TO 36.9 IN ADULT: Primary | ICD-10-CM

## 2025-07-09 DIAGNOSIS — Z71.3 DIETARY COUNSELING: ICD-10-CM

## 2025-07-09 DIAGNOSIS — E66.812 CLASS 2 OBESITY DUE TO EXCESS CALORIES WITHOUT SERIOUS COMORBIDITY WITH BODY MASS INDEX (BMI) OF 36.0 TO 36.9 IN ADULT: ICD-10-CM

## 2025-07-09 DIAGNOSIS — G47.33 OSA (OBSTRUCTIVE SLEEP APNEA): Primary | ICD-10-CM

## 2025-07-09 PROCEDURE — 1159F MED LIST DOCD IN RCRD: CPT | Performed by: PHYSICIAN ASSISTANT

## 2025-07-09 PROCEDURE — 1160F RVW MEDS BY RX/DR IN RCRD: CPT | Performed by: PHYSICIAN ASSISTANT

## 2025-07-09 PROCEDURE — 99213 OFFICE O/P EST LOW 20 MIN: CPT | Performed by: FAMILY MEDICINE

## 2025-07-09 PROCEDURE — 1159F MED LIST DOCD IN RCRD: CPT | Performed by: FAMILY MEDICINE

## 2025-07-09 PROCEDURE — 1160F RVW MEDS BY RX/DR IN RCRD: CPT | Performed by: FAMILY MEDICINE

## 2025-07-09 PROCEDURE — 1170F FXNL STATUS ASSESSED: CPT | Performed by: FAMILY MEDICINE

## 2025-07-09 PROCEDURE — 99213 OFFICE O/P EST LOW 20 MIN: CPT | Performed by: PHYSICIAN ASSISTANT

## 2025-07-09 NOTE — PROGRESS NOTES
[facility] PULMONARY  SLEEP PROGRESS NOTE        ESTRELLA NELSON is a 69 year old female who presents today for 3 month f/u      Chief Complaint   Patient presents with    Obstructive Sleep Apnea (LANDY)     Pt agreed to AI listening, pt states mask strap issues          The following individual(s) verbally consented to be recorded using ambient AI listening technology and understand that they can each withdraw their consent to this listening technology at any point by asking the clinician to turn off or pause the recording:    Patient name: Estrella Nelson  Additional names:  NA       History of Present Illness  Estrella Nelson is a 69 year old female with sleep apnea who presents for follow-up regarding CPAP therapy adjustments.    She reports overall improved sleep quality with her CPAP since her last visit. She is wearing the Josy mask but experiences mask leaks, especially when sleeping on her side. The mask is fitted tightly to prevent fluttering. Her CPAP pressure is set at thirteen, which she finds comfortable. She experiences dry mouth at night, likely due to sleeping with her mouth open, and there is still water in the CPAP chamber in the morning. She inquires about head straps leaving a dent on her head.     She is actively losing weight by counting calories and has decided against using Zepbound due to digestive issues. She is working closely with her PCP. She feels fatigued, which she attributes to abnormal thyroid function tests. She has not tried melatonin but uses half a Unisom and magnesium glycinate, recently increased to 200 mg per day, which helps with nighttime cramps.        Mchenry score: 11/24    Dry throat - cough - improved with biotene nasal spray  Water chamber still has water in AM      Developed a sore under her nose with F40. Switched to F20 to allow welt to heal  Tried mask liner - made the mask slide more  Switched to Josy on 3/24 - air fluttering on the sides  Tried a smaller  Josy size - felt it obstructed her nose.   Just got a sample of Not iT dreamwear - has yet to try   FFM - leaks, too close to eyes  Nasal - mouth opens, bruising across her nose      In bed 930-10p  Reads before going to sleep  Out of bed 7-730a  SL varies - few minutes to a couple hours. Once a week- takes a couple hours  Nighttime awakenings not daily   Naps tries to avoid  Caffeine discontinues at 1pm, 2 glasses iced tea in AM, occ soda     Denies restless legs, dry mouth, tinnitus, chest pain, thoracic back pain, bloating, drowsy driving, sleep walking, sleep talking  Teeth grinding- mouth guard   Calf cramps improved with magnesium     DME company: [employer]  Mask type: ESTRELLA Delgadillo  04/10/2025 - 2025  Patient ID: [id number]  : 10/01/1955  Age: 69 years  [address]  [employer]  [address] [facility]  Compliance Report  Usage 04/10/2025 - 2025  Usage days 90/90 days (100%)  >= 4 hours 88 days (98%)  < 4 hours 2 days (2%)  Usage hours 780 hours 45 minutes  Average usage (total days) 8 hours 41 minutes  Average usage (days used) 8 hours 41 minutes  Median usage (days used) 9 hours 3 minutes  Total used hours (value since last reset - 2025) 1,882 hours  AirSense 11 AutoSet  Serial number [id number]  Mode CPAP  Set pressure 13 cmH2O  EPR Ramp Only  EPR level 2  Therapy  Leaks - L/min Median: 0.8 95th percentile: 3.7 Maximum: 15.0  Events per hour AI: 4.8 HI: 2.3 AHI: 7.1  Apnea Index Central: 2.9 Obstructive: 1.8 Unknown: 0.1  RERA Index 0.5  Cheyne-Hoyos respiration (average duration per night) 0 minutes (0%)       Patient: Sleep review of systems today: see form.    10/29/2024 CPAP Tx  195#   Respiratory Analysis: Monitoring revealed resolution of respiratory events with CPAP at 13 cm H2O. Supine REM was observed at the final pressure. The Apnea/Hypopnea Index (AHI) was 3.4 at the final setting. The central  sleep apnea index was 0. The oxygen john was 85.3% and the patient spent 0.2% of sleep time spent with oxygen levels below 88%.   RECOMMENDATIONS:   1. The patient should be prescribed CPAP at 13 cm H2O, with humidity at 4 and EPR on.   2. The patient was fitted with a [employer] Josy mask, size Medium.       7/15/2024 PSG  Respiratory Analysis: The Apnea-Hypopnea Index (AHI) was 30.1 events per hour. REM related AHI was 40.5 events per hour. Supine related AHI was 50.2. Lateral related AHI was 5.9. The Central Apnea Index was 0.5. The oxygen saturation john was 86% and patient spent 1.1% with saturations less than 88%.       Pt  PCP:  Ashley Wong DO  No referring provider defined for this encounter.           No data to display                  Past Medical History[1]  Past Surgical History[2]  Social History:  Social History     Social History Narrative    Not on file     Short Social Hx on File[3]  Family History:  Family History[4]  Allergies:  Allergies[5]  Current Meds:  Current Medications[6]   Counseling given: Not Answered         Problem List:  Problem List[7]    REVIEW OF SYSTEMS:   Review of Systems  See HPI     EXAM:   /70 (BP Location: Right arm, Patient Position: Sitting, Cuff Size: adult)   Pulse 62   Resp 16   Ht 5' 3\" (1.6 m)   Wt 204 lb (92.5 kg)   LMP 02/15/2011   SpO2 99%   BMI 36.14 kg/m²  Estimated body mass index is 36.14 kg/m² as calculated from the following:    Height as of this encounter: 5' 3\" (1.6 m).    Weight as of this encounter: 204 lb (92.5 kg).   Neck in inches:      Wt Readings from Last 6 Encounters:   07/09/25 204 lb (92.5 kg)   06/04/25 211 lb 8 oz (95.9 kg)   04/21/25 217 lb 2 oz (98.5 kg)   04/09/25 217 lb (98.4 kg)   01/06/25 205 lb (93 kg)   10/27/24 200 lb (90.7 kg)     BP Readings from Last 3 Encounters:   07/09/25 118/70   06/04/25 116/80   04/21/25 122/74     Pulse Readings from Last 3 Encounters:   07/09/25 62   06/04/25 66   04/21/25 78      SpO2 Readings from Last 3 Encounters:   07/09/25 99%   06/04/25 97%   04/21/25 96%      Ideal body weight: 52.4 kg (115 lb 8.3 oz)  Adjusted ideal body weight: 68.5 kg (150 lb 14.6 oz)    Vital signs reviewed.  Physical Exam  Vitals and nursing note reviewed.   Constitutional:       Appearance: Normal appearance. She is obese.   HENT:      Head: Normocephalic and atraumatic.      Right Ear: External ear normal.      Left Ear: External ear normal.   Pulmonary:      Effort: Pulmonary effort is normal. No respiratory distress.   Musculoskeletal:      Cervical back: Normal range of motion and neck supple.   Neurological:      General: No focal deficit present.      Mental Status: She is alert and oriented to person, place, and time.   Psychiatric:         Attention and Perception: Attention and perception normal.         Mood and Affect: Mood and affect normal.         Speech: Speech normal.         Behavior: Behavior normal. Behavior is cooperative.         Thought Content: Thought content normal.         Cognition and Memory: Cognition and memory normal.         Judgment: Judgment normal.        Assessment & Plan  Obstructive Sleep Apnea (LANDY)  LANDY managed with CPAP therapy. Improved sleep quality but mask leaks and dry mouth persist. Current CPAP pressure of 13 insufficient. Side sleeping contributes to leaks. Dry mouth and morning gas likely due to open mouth sleeping.  - Switch to variable pressure setting with range 11 to 15, repeat DL in 2 weeks  - Increase humidity level from 4 to 5.  - Advise using extra fabric on head strap to reduce pressure marks.  - Monitor CPAP data in a couple of weeks.    Obesity BMI 36.14  Weight loss achieved through calorie counting. Not using medications like Zepbound due to digestive issues.       There are no Patient Instructions on file for this visit.    Independent interpretation of Sleep Download as defined above.  Continue with Rx management of Sleep apnea with PAP  therapy.    COMPLIANCE is required by insurance for 4 hours a night most nights of the week.    Advised if still with sleep apnea and not using CPAP has a 7 fold increase in risk of heart attack, stroke, abnormal heart rhythm  and death,  increased risk of driving accidents.     Advised to refrain from driving when sleepy.      Recommend weight loss, and maintain and optimal BMI with Exercise 30 minutes most days to target heart rate .     Advised patient to change filters,masks,hoses  and tubes and equiptment on a  regular schedule.    Filters and seals shall be changed every 1 month,  Hoses every 3 months,   CPAP mask and humidifier chamber changed every 6 month  with the durable medical equipment provider.         Meds & Refills for this Visit:  Requested Prescriptions      No prescriptions requested or ordered in this encounter       Outcome: Parent verbalizes understanding. Parent is notified to call with any questions, complications, allergies, or worsening or changing symptoms.  Parent is to call with any side effects or complications from the treatments as a result of today.     \" This note was created utilizing Dragon speech recognition software.  Please excuse any grammatical errors. Call my office if you have any questions regarding this note. \"     [place] Genao PA-C           [1]   Past Medical History:   Aortic aneurysm, thoracic    ascending    Depression    Endocrine disorder    H/O mammogram    Hyperparathyroidism (HCC)    Hypertension    Hypothyroidism    Pneumonia, organism unspecified(486)    Routine gynecological examination    Sinusitis   [2]   Past Surgical History:  Procedure Laterality Date    Breast surgery procedure unlisted  2001    2 nodules in R breast removed    Colonoscopy  2007    Colonoscopy  2013    Dr. CHOWDARY -- due in 10 years    Cyst aspiration right Right 2003    Cyst aspiration right Right 2007    D & c  11/28/2011    Dual energy x-ray absorptiometry, body composition study, 1+  sites  11/12/2009    Hysterectomy  02/2013    no BSO, TVH    Other surgical history  01/12/2005    R parathyroid \"ectomy 1 lower R\"    Thyroid lobectomy,unilat Right 01/12/2005    right thyroid nodule removal    Vaginal hysterectomy, uterus >250 gms; w/removal, tube(s) &/or ovary(s) w/repair of enterocele  02/15/2013   [3]   Social History  Socioeconomic History    Marital status:    Occupational History    Occupation: [employer]     Comment: [employer]   Tobacco Use    Smoking status: Never    Smokeless tobacco: Never   Vaping Use    Vaping status: Never Used   Substance and Sexual Activity    Alcohol use: Yes     Comment: Social wine    Drug use: No   Other Topics Concern    Caffeine Concern Yes     Comment: 1 daily     Exercise Yes     Comment: WALKING & aerobics class 2 x week     Social Drivers of Health     Food Insecurity: No Food Insecurity (4/21/2025)    NCSS - Food Insecurity     Worried About Running Out of Food in the Last Year: No     Ran Out of Food in the Last Year: No   Transportation Needs: No Transportation Needs (4/21/2025)    NCSS - Transportation     Lack of Transportation: No   Housing Stability: Not At Risk (4/21/2025)    NCSS - Housing/Utilities     Has Housing: Yes     Worried About Losing Housing: No     Unable to Get Utilities: No   [4]   Family History  Problem Relation Age of Onset    Other (angina pectoris) Mother 102    Other (covid) Mother     Other (alzheimer's disease) Father     Breast Cancer Sister 37        Late 30's. 2nd time 67    Other (stroke syndrome) Maternal Grandmother         brain aneurysm    Other (stroke syndrome) Other         mat uncle, brain aneurysm   [5]   Allergies  Allergen Reactions    Seasonal Runny nose   [6]   Current Outpatient Medications   Medication Sig Dispense Refill    diazePAM (VALIUM) 5 MG Oral Tab Take 1 tablet (5 mg total) by mouth daily as needed for Anxiety. Take 1 hour prior to procedure and repeat if needed. 2 tablet 0     VALSARTAN-HYDROCHLOROTHIAZIDE 80-12.5 MG Oral Tab TAKE ONE TABLET BY MOUTH ONCE DAILY 100 tablet 0    Magnesium Glycinate 100 MG Oral Cap Take by mouth.      azelastine 0.1 % Nasal Solution 2 sprays by Nasal route in the morning and 2 sprays before bedtime. (Patient not taking: Reported on 6/4/2025)      Sodium Chloride-Xylitol (XLEAR SINUS CARE SPRAY) Nasal Solution by Nasal route.      cetirizine 10 MG Oral Tab       clindamycin 1 % External Solution Apply 1 Application topically daily. To affected areas of scalp      ketoconazole 2 % External Shampoo Apply 10 mL topically twice a week.      ergocalciferol 1.25 MG (97006 UT) Oral Cap Take 1 capsule (50,000 Units total) by mouth once a week.      levothyroxine 50 MCG Oral Tab 1 tablet (50 mcg total) daily.      Cholecalciferol (VITAMIN D) 25 MCG (1000 UT) Oral Tab Take 1,000 Int'l Units by mouth daily.      Fiber-Vitamins-Minerals ([facility] FIBER GOOD) Oral Chew Tab Chew 2 tablets by mouth daily.      Doxylamine Succinate, Sleep, (UNISOM OR) Take 0.5 tablets by mouth as needed.       [7]   Patient Active Problem List  Diagnosis    Obesity, unspecified    Adhesive capsulitis of shoulder    Other specified menopausal and postmenopausal disorder    Benign neoplasm of cerebral meninges (HCC)    Vaginitis and vulvovaginitis, unspecified    Essential hypertension    Depression    Pure hyperglyceridemia    Reflux esophagitis    Hyperparathyroidism (HCC)    Hypothyroidism    Dyslipidemia    Vitamin D deficiency    Thyroid dysfunction    Thyroid nodule    Laryngopharyngeal reflux    Parathyroid adenoma    LANDY (obstructive sleep apnea)- severe, AHI 30.1    Postoperative hypothyroidism    Multiple thyroid nodules    Thoracic aortic aneurysm without rupture, unspecified part    Current moderate episode of major depressive disorder without prior episode (HCC)    Class 2 obesity due to excess calories without serious comorbidity with body mass index (BMI) of 38.0 to  38.9 in adult

## 2025-07-09 NOTE — PROGRESS NOTES
Food Journal  Reviewed and Discussed:          Patient has a Food Journal?: yes  Patient is reading nutrition labels?  yes  Average Caloric Intake: 1600          Average CHO Intake: unknown   Is patient exercising?  yes  Type of exercise? Walking and gardening 1-2 x a week      Eating Habits  Patient states the following:  Meals a day? 3  Length of time it takes to consume a meal: 20 min   # of snacks per day: 1       Type of snacks:  fruit, nuts and chips  Amount of soda consumption per day: once a week   Amount of water (in ounces) per day:  60 oz   Drinking between meals only: no  Toughest challenge: Has been more hungry lately.          The following individual(s) verbally consented to be recorded using ambient AI listening technology and understand that they can each withdraw their consent to this listening technology at any point by asking the clinician to turn off or pause the recording:    Patient name: Steffanie Nelson

## 2025-07-09 NOTE — PROGRESS NOTES
KASSIDYESTRELLA BRUCE  04/10/2025 - 2025  Patient ID: [id number]  : 10/01/1955  Age: 69 years  [address]  [employer]  [address] [facility], [address]  Compliance Report  Usage 04/10/2025 - 2025  Usage days 90/90 days (100%)  >= 4 hours 88 days (98%)  < 4 hours 2 days (2%)  Usage hours 780 hours 45 minutes  Average usage (total days) 8 hours 41 minutes  Average usage (days used) 8 hours 41 minutes  Median usage (days used) 9 hours 3 minutes  Total used hours (value since last reset - 2025) 1,882 hours  AirSense 11 AutoSet  Serial number [id number]  Mode CPAP  Set pressure 13 cmH2O  EPR Ramp Only  EPR level 2  Therapy  Leaks - L/min Median: 0.8 95th percentile: 3.7 Maximum: 15.0  Events per hour AI: 4.8 HI: 2.3 AHI: 7.1  Apnea Index Central: 2.9 Obstructive: 1.8 Unknown: 0.1  RERA Index 0.5  Cheyne-Hoyos respiration (average duration per night) 0 minutes (0%)

## 2025-07-09 NOTE — PROGRESS NOTES
[facility], [address] [address]  Dept: [phone]       Patient:  Steffanie Nelson  :      10/1/1955  MRN:      [id number]    Chief Complaint:    Chief Complaint   Patient presents with    Weight Check       SUBJECTIVE     History of Present Illness:  Steffanie is being seen today for a follow-up for weight  History of Present Illness  Steffanie Nelson is a 69 year old female who presents for weight management and dietary counseling.    She has successfully lost eight pounds, reducing her weight from 211 to 207 pounds, and is pleased with her progress. She can now fit into clothes she couldn't wear at the beginning of the summer. She manages her diet by focusing on low-carb, fat-fueled nutrition and is mindful of her intake of nuts and chips due to their caloric density. She uses a scale to measure her food and calculates her own calories, averaging about 1600 calories per day, which includes occasional treats like ice cream and tien lattes. She is considering using an makenzie to track her macronutrient intake more precisely.    She is considering her options for the COVID booster, having last received a shot in November. She is contemplating whether to get the current booster or wait for a new one expected in the fall. She has had the COVID vaccine multiple times and is weighing the decision based on her current risk factors, including not flying or engaging in high-risk activities.    She has recently had blood work done, noting that her calcium and TSH levels have changed but remain within range. Her vitamin D was high, and she is currently taking 50,000 IU weekly. She is scheduled to see Dr. Slater next week for further evaluation of her calcium levels and hyperparathyroidism management.    For physical activity, she walks for an extended period at least once a week and engages in gardening, which she enjoys. She  is focused on maintaining a lifestyle change that allows her to enjoy activities and foods she likes.        Past Medical History: Past Medical History[1]       OBJECTIVE     Vitals: /70 (BP Location: Left arm, Patient Position: Sitting, Cuff Size: adult)   Pulse 59   Resp 18   Ht 5' 3\" (1.6 m)   Wt 207 lb 2 oz (94 kg)   LMP 02/15/2011   SpO2 97%   BMI 36.69 kg/m²     Initial weight loss: 4   Total weight loss: 8   Start weight: 217    Wt Readings from Last 3 Encounters:   07/09/25 207 lb 2 oz (94 kg)   07/09/25 204 lb (92.5 kg)   06/04/25 211 lb 8 oz (95.9 kg)       Patient Medications:  Current Medications[2]  Allergies:  Seasonal     Social History:    Social History     Socioeconomic History    Marital status:      Spouse name: Not on file    Number of children: Not on file    Years of education: Not on file    Highest education level: Not on file   Occupational History    Occupation: [employer]     Comment: [employer]   Tobacco Use    Smoking status: Never    Smokeless tobacco: Never   Vaping Use    Vaping status: Never Used   Substance and Sexual Activity    Alcohol use: Yes     Comment: Social wine    Drug use: No    Sexual activity: Not on file   Other Topics Concern     Service Not Asked    Blood Transfusions Not Asked    Caffeine Concern Yes     Comment: 1 daily     Occupational Exposure Not Asked    Hobby Hazards Not Asked    Sleep Concern Not Asked    Stress Concern Not Asked    Weight Concern Not Asked    Special Diet Not Asked    Back Care Not Asked    Exercise Yes     Comment: WALKING & aerobics class 2 x week    Bike Helmet Not Asked    Seat Belt Not Asked    Self-Exams Not Asked   Social History Narrative    Not on file     Social Drivers of Health     Food Insecurity: No Food Insecurity (4/21/2025)    NCSS - Food Insecurity     Worried About Running Out of Food in the Last Year: No     Ran Out of Food in the Last Year: No   Transportation Needs: No Transportation  Needs (4/21/2025)    NCSS - Transportation     Lack of Transportation: No   Stress: Not on file   Housing Stability: Not At Risk (4/21/2025)    NCSS - Housing/Utilities     Has Housing: Yes     Worried About Losing Housing: No     Unable to Get Utilities: No     Surgical History:  Past Surgical History[3]  Family History:  Family History[4]  Food Journal  Reviewed and Discussed:          Patient has a Food Journal?: yes  Patient is reading nutrition labels?  yes  Average Caloric Intake: 1600          Average CHO Intake: unknown   Is patient exercising?  yes  Type of exercise? Walking and gardening 1-2 x a week      Eating Habits  Patient states the following:  Meals a day? 3  Length of time it takes to consume a meal: 20 min   # of snacks per day: 1       Type of snacks:  fruit, nuts and chips  Amount of soda consumption per day: once a week   Amount of water (in ounces) per day:  60 oz   Drinking between meals only: no  Toughest challenge: Has been more hungry lately.           The following individual(s) verbally consented to be recorded using ambient AI listening technology and understand that they can each withdraw their consent to this listening technology at any point by asking the clinician to turn off or pause the recording:     Patient name: Steffanie Nelson           ROS:    Pertinent items are noted in HPI.  A comprehensive review of systems was negative.  All other systems were reviewed and are negative    Physical Exam:   /70 (BP Location: Left arm, Patient Position: Sitting, Cuff Size: adult)   Pulse 59   Resp 18   Ht 5' 3\" (1.6 m)   Wt 207 lb 2 oz (94 kg)   LMP 02/15/2011   SpO2 97%   BMI 36.69 kg/m²   General appearance: alert, appears stated age, and cooperative  Head: Normocephalic, without obvious abnormality, atraumatic  Neck: no adenopathy, no carotid bruit, no JVD, supple, symmetrical, trachea midline, and thyroid not enlarged, symmetric, no tenderness/mass/nodules  Lungs:  clear to auscultation bilaterally  Heart: S1, S2 normal, no murmur, click, rub or gallop, regular rate and rhythm  Abdomen: soft, non-tender; bowel sounds normal; no masses,  no organomegaly  Extremities: extremities normal, atraumatic, no cyanosis or edema  Skin: Skin color, texture, turgor normal. No rashes or lesions    ASSESSMENT/PLAN     Encounter Diagnoses   Name Primary?    Class 2 obesity due to excess calories without serious comorbidity with body mass index (BMI) of 36.0 to 36.9 in adult Yes    Dietary counseling     Exercise counseling     Medication management        No orders of the defined types were placed in this encounter.      Nutritional Goals  Calorie-controlled diet:  1700    Behavior Modifications Reviewed and Discussed  Eat 3 meals per day, Plan meals in advance, Drink 64 oz of water per day, Utlize portion control strategies to reduce calorie intake, Identify triggers for eating and manage cues, and Eat slowly and take 20 to 30 minutes to complete each meal    Exercise Goals Reviewed and Discussed    Walk for  30 Minutes      Meds & Refills for this Visit:  Requested Prescriptions      No prescriptions requested or ordered in this encounter       Imaging & Consults:  None    Assessment & Plan  Obesity  She lost 8 pounds, now weighing 207 pounds. She follows a low-carb, fat-fueled diet with 1600 calories daily, focusing on protein intake. Engages in walking and gardening.  - Continue dietary plan with calorie counting and macronutrient balance, emphasizing protein.  - Encourage regular physical activity, including walking and gardening.    Hypercalcemia  Recent blood work showed elevated calcium levels within normal range. Endocrinologist Dr. Slater to evaluate further.  - Follow up with endocrinologist Dr. Slater regarding calcium levels.    Thyroid function changes  Recent blood work indicated a change in TSH levels within normal range. Endocrinologist Dr. Slater to evaluate further.  - Follow up  with endocrinologist Dr. Slater regarding TSH levels.    General Health Maintenance  She plans to wait for the new COVID-19 booster formulation in the fall, as she is not currently at high risk.  - Plan to receive the new COVID-19 booster in the fall when the new formulation is available.      Return in about 4 weeks (around 8/6/2025) for OM -15.           [1]   Past Medical History:   Aortic aneurysm, thoracic    ascending    Depression    Endocrine disorder    H/O mammogram    Hyperparathyroidism (HCC)    Hypertension    Hypothyroidism    Pneumonia, organism unspecified(486)    Routine gynecological examination    Sinusitis   [2]   Current Outpatient Medications   Medication Sig Dispense Refill    diazePAM (VALIUM) 5 MG Oral Tab Take 1 tablet (5 mg total) by mouth daily as needed for Anxiety. Take 1 hour prior to procedure and repeat if needed. 2 tablet 0    VALSARTAN-HYDROCHLOROTHIAZIDE 80-12.5 MG Oral Tab TAKE ONE TABLET BY MOUTH ONCE DAILY 100 tablet 0    Magnesium Glycinate 100 MG Oral Cap Take by mouth.      Sodium Chloride-Xylitol (XLEAR SINUS CARE SPRAY) Nasal Solution by Nasal route.      cetirizine 10 MG Oral Tab       clindamycin 1 % External Solution Apply 1 Application topically daily. To affected areas of scalp      ketoconazole 2 % External Shampoo Apply 10 mL topically twice a week.      ergocalciferol 1.25 MG (32520 UT) Oral Cap Take 1 capsule (50,000 Units total) by mouth once a week.      levothyroxine 50 MCG Oral Tab 1 tablet (50 mcg total) daily.      Cholecalciferol (VITAMIN D) 25 MCG (1000 UT) Oral Tab Take 1,000 Int'l Units by mouth daily.      Fiber-Vitamins-Minerals ([place] DAILY CARE FIBER GOOD) Oral Chew Tab Chew 2 tablets by mouth daily.      Doxylamine Succinate, Sleep, (UNISOM OR) Take 0.5 tablets by mouth as needed.       [3]   Past Surgical History:  Procedure Laterality Date    Breast surgery procedure unlisted  2001    2 nodules in R breast removed    Colonoscopy  2007     Colonoscopy  2013    Dr. CHOWDARY -- due in 10 years    Cyst aspiration right Right 2003    Cyst aspiration right Right 2007    D & c  11/28/2011    Dual energy x-ray absorptiometry, body composition study, 1+ sites  11/12/2009    Hysterectomy  02/2013    no BSO, TVH    Other surgical history  01/12/2005    R parathyroid \"ectomy 1 lower R\"    Thyroid lobectomy,unilat Right 01/12/2005    right thyroid nodule removal    Vaginal hysterectomy, uterus >250 gms; w/removal, tube(s) &/or ovary(s) w/repair of enterocele  02/15/2013   [4]   Family History  Problem Relation Age of Onset    Other (angina pectoris) Mother 102    Other (covid) Mother     Other (alzheimer's disease) Father     Breast Cancer Sister 37        Late 30's. 2nd time 67    Other (stroke syndrome) Maternal Grandmother         brain aneurysm    Other (stroke syndrome) Other         mat uncle, brain aneurysm

## 2025-07-11 ENCOUNTER — TELEPHONE (OUTPATIENT)
Facility: CLINIC | Age: 70
End: 2025-07-11

## 2025-07-11 NOTE — TELEPHONE ENCOUNTER
[place] Genao, TONY  P [facility] Pulmonary Sleep Staff  Please repeat DL in 2 weeks - pressure was changed from 13cwp to APAP 11-15cwp    07/09/2025, 07:42 PM    Settings confirmed on device    Set Mode to AutoSet for Her  Set Min Pressure to 11 cmH2O  Set Max Pressure to 15 cmH2O  Set EPR to Ramp Only  Set EPR level to 2  Set Ramp enable to On  Set Ramp time to 15 min  Set Start pressure to 8.0 cmH2O  Set Climate Control to Manual  Set Humidifier level to 5  Set Tube temperature to 80°F (27°C)  07/09/2025, 07:42 PM    Settings confirmed on device    Set Mode to CPAP  Set Set pressure to 15.0 cmH2O  Set EPR to Ramp Only  Set EPR level to 2  Set Ramp enable to On  Set Ramp time to 15 min  Set Start pressure to 8.0 cmH2O  Set Climate Control to Manual  Set Humidifier level to 4  Set Tube temperature to 80°F (27°C)    [phone] (home)

## 2025-07-14 ENCOUNTER — HOSPITAL ENCOUNTER (OUTPATIENT)
Dept: MRI IMAGING | Facility: HOSPITAL | Age: 70
Discharge: HOME | End: 2025-07-14
Attending: FAMILY MEDICINE
Payer: MEDICARE

## 2025-07-14 DIAGNOSIS — D32.0 BENIGN NEOPLASM OF CEREBRAL MENINGES (HCC): ICD-10-CM

## 2025-07-14 PROCEDURE — 70553 MRI BRAIN STEM W/O & W/DYE: CPT | Performed by: FAMILY MEDICINE

## 2025-07-14 PROCEDURE — A9575 INJ GADOTERATE MEGLUMI 0.1ML: HCPCS | Performed by: FAMILY MEDICINE

## 2025-07-14 RX ORDER — GADOTERATE MEGLUMINE 376.9 MG/ML
20 INJECTION INTRAVENOUS
Status: COMPLETED | OUTPATIENT
Start: 2025-07-14 | End: 2025-07-14

## 2025-07-14 RX ADMIN — GADOTERATE MEGLUMINE 24 ML: 376.9 INJECTION INTRAVENOUS at 17:08:00

## 2025-07-23 ENCOUNTER — OFFICE VISIT (OUTPATIENT)
Dept: FAMILY MEDICINE CLINIC | Facility: CLINIC | Age: 70
End: 2025-07-23
Payer: MEDICARE

## 2025-07-23 VITALS
BODY MASS INDEX: 36.41 KG/M2 | DIASTOLIC BLOOD PRESSURE: 72 MMHG | HEART RATE: 64 BPM | HEIGHT: 63 IN | OXYGEN SATURATION: 97 % | WEIGHT: 205.5 LBS | SYSTOLIC BLOOD PRESSURE: 118 MMHG | RESPIRATION RATE: 16 BRPM

## 2025-07-23 DIAGNOSIS — Z79.899 MEDICATION MANAGEMENT: ICD-10-CM

## 2025-07-23 DIAGNOSIS — G43.109 OCULAR MIGRAINE: ICD-10-CM

## 2025-07-23 DIAGNOSIS — E55.9 VITAMIN D DEFICIENCY: ICD-10-CM

## 2025-07-23 DIAGNOSIS — D32.9 MENINGIOMA (HCC): ICD-10-CM

## 2025-07-23 DIAGNOSIS — N39.0 ACUTE UTI: Primary | ICD-10-CM

## 2025-07-23 DIAGNOSIS — E89.0 POSTOPERATIVE HYPOTHYROIDISM: ICD-10-CM

## 2025-07-23 LAB
BILIRUBIN: NEGATIVE
GLUCOSE (URINE DIPSTICK): NEGATIVE MG/DL
KETONES (URINE DIPSTICK): NEGATIVE MG/DL
MULTISTIX LOT#: ABNORMAL NUMERIC
NITRITE, URINE: NEGATIVE
PH, URINE: 5.5 (ref 4.5–8)
PROTEIN (URINE DIPSTICK): NEGATIVE MG/DL
SPECIFIC GRAVITY: 1.01 (ref 1–1.03)
UROBILINOGEN,SEMI-QN: 0.2 MG/DL (ref 0–1.9)

## 2025-07-23 PROCEDURE — 87086 URINE CULTURE/COLONY COUNT: CPT | Performed by: FAMILY MEDICINE

## 2025-07-23 RX ORDER — NITROFURANTOIN 25; 75 MG/1; MG/1
100 CAPSULE ORAL 2 TIMES DAILY
Qty: 10 CAPSULE | Refills: 0 | Status: SHIPPED | OUTPATIENT
Start: 2025-07-23

## 2025-07-23 NOTE — PROGRESS NOTES
Subjective:   Steffanie Nelson is a 69 year old female who presents for UTI (Symptoms include urgency, discomfort, strong odor when concentrated. Denies any blood in the urine or pain in the back but there is discomfort when she urinates. States it started yesterday morning.  )         History/Other:   History of Present Illness  Steffanie Nelson is a 69 year old female who presents with symptoms suggestive of a urinary tract infection.    Symptoms began yesterday morning, including a noticeable aroma and urgency when urinating. She does not experience a burning sensation but describes a different feeling when urinating. Increased water intake has allowed her to extend the time between urinations to more than an hour by the afternoon. No back pain, fever, or chills. She recalls having a urinary tract infection about a year ago, which was treated with antibiotics that improved her symptoms. She mentions that her urinary tract infections have become more frequent.    She expresses concern about a meningioma, noting a growth and change over time. She has an upcoming appointment with a neurologist in mid-September. She has experienced ocular migraines in the past, with a significant episode occurring in the spring, lasting about 15-20 minutes and affecting the center of her vision. These episodes are frightening.    She recently saw Dr. Slater, who indicated that her blood tests suggest a need to adjust her vitamin D supplement. Her TSH levels were not a concern as her T4 was normal.   Chief Complaint Reviewed and Verified  Nursing Notes Reviewed and   Verified  Tobacco Reviewed  Allergies Reviewed  Medications Reviewed    Problem List Reviewed  Medical History Reviewed  Surgical History   Reviewed  OB Status Reviewed  Family History Reviewed         Tobacco:  She has never smoked tobacco.    Current Medications[1]      Review of Systems:  Review of Systems  A comprehensive 10 point review of systems  was completed.  Pertinent positives and negatives noted in the the HPI.     Objective:   /72 (BP Location: Left arm, Patient Position: Sitting, Cuff Size: adult)   Pulse 64   Resp 16   Ht 5' 3\" (1.6 m)   Wt 205 lb 8 oz (93.2 kg)   LMP 02/15/2011   SpO2 97%   BMI 36.40 kg/m²  Estimated body mass index is 36.4 kg/m² as calculated from the following:    Height as of this encounter: 5' 3\" (1.6 m).    Weight as of this encounter: 205 lb 8 oz (93.2 kg).  Physical Exam    GENERAL: well developed, well nourished,in no apparent distress  PSYCHE: normal mood and affect  SKIN: no rashes,no suspicious lesions  HEENT: atraumatic, normocephalic  LUNGS: clear to auscultation  CARDIO: RRR without murmur  GI: good BS's,no masses, HSM or tenderness; negative Janes's  EXTREMITIES: no cyanosis, clubbing or edema   Results  Urine culture: Moderate hematuria, moderate pyuria, negative nitrite (07/23/2025)      Assessment & Plan:   1. Acute UTI (Primary)  -     Urine Culture, Routine; Future; Expected date: 07/23/2025  -     Urine Dip, auto without Micro  -     Nitrofurantoin Monohyd Macro; Take 1 capsule (100 mg total) by mouth 2 (two) times daily.  Dispense: 10 capsule; Refill: 0  -     Urine Culture, Routine  2. Postoperative hypothyroidism  3. Meningioma (HCC)  4. Ocular migraine  5. Vitamin D deficiency  6. Medication management    Assessment & Plan  Urinary Tract Infection (UTI)  Urinalysis indicates infection with moderate blood and leukocytes. No positive nitrate. Previous UTI responded well to antibiotics.  - Prescribe Macrobid 100 mg, twice daily for 5 days.  - Encourage increased fluid intake.  - Advise cranberry juice if needed.  - Send urine sample for culture.    Meningioma  Known meningioma with recent minimal growth. Typically benign. Recent ocular migraines noted. Primary concern is monitoring for changes.  - Monitor meningioma growth.  - Attend neurology appointment in mid-September.    Ocular  Migraines  Recent episode with central vision disturbance. No immediate treatment changes planned.  - Monitor symptoms.  - Discuss with neurologist during upcoming appointment.    Vitamin D Deficiency  Blood tests indicate need for adjustment in vitamin D supplementation. TSH levels are not concerning as T4 is normal.  - Adjust vitamin D supplementation as needed.      Return if symptoms worsen or fail to improve.      Ashley Wong DO, 7/23/2025, 12:14 PM             [1]   Current Outpatient Medications   Medication Sig Dispense Refill    nitrofurantoin monohydrate macro (MACROBID) 100 MG Oral Cap Take 1 capsule (100 mg total) by mouth 2 (two) times daily. 10 capsule 0    diazePAM (VALIUM) 5 MG Oral Tab Take 1 tablet (5 mg total) by mouth daily as needed for Anxiety. Take 1 hour prior to procedure and repeat if needed. 2 tablet 0    VALSARTAN-HYDROCHLOROTHIAZIDE 80-12.5 MG Oral Tab TAKE ONE TABLET BY MOUTH ONCE DAILY 100 tablet 0    Magnesium Glycinate 100 MG Oral Cap Take by mouth.      Sodium Chloride-Xylitol (XLEAR SINUS CARE SPRAY) Nasal Solution by Nasal route.      cetirizine 10 MG Oral Tab       clindamycin 1 % External Solution Apply 1 Application topically daily. To affected areas of scalp      ketoconazole 2 % External Shampoo Apply 10 mL topically twice a week.      ergocalciferol 1.25 MG (44691 UT) Oral Cap Take 1 capsule (50,000 Units total) by mouth once a week.      levothyroxine 50 MCG Oral Tab 1 tablet (50 mcg total) daily.      Cholecalciferol (VITAMIN D) 25 MCG (1000 UT) Oral Tab Take 1,000 Int'l Units by mouth daily.      Fiber-Vitamins-Minerals ([facility] FIBER GOOD) Oral Chew Tab Chew 2 tablets by mouth daily.      Doxylamine Succinate, Sleep, (UNISOM OR) Take 0.5 tablets by mouth as needed.

## 2025-07-24 NOTE — TELEPHONE ENCOUNTER
Repeat dL from 2025    ESTRELLA COWART  2025 - 2025  Patient ID: [id number]  : 10/01/1955  Age: 69 years  [address]  [employer]  [address] [facility], [facility]  Compliance Report  Usage 2025 - 2025  Usage days 13/13 days (100%)  >= 4 hours 13 days (100%)  < 4 hours 0 days (0%)  Usage hours 113 hours 23 minutes  Average usage (total days) 8 hours 43 minutes  Average usage (days used) 8 hours 43 minutes  Median usage (days used) 8 hours 53 minutes  Total used hours (value since last reset - 2025) 2,013 hours  AirSense 11 AutoSet  Serial number [id number]  Mode AutoSet for Her  Min Pressure 11 cmH2O  Max Pressure 15 cmH2O  EPR Ramp Only  EPR level 2  Therapy  Pressure - cmH2O Median: 12.3 95th percentile: 13.5 Maximum: 14.0  Leaks - L/min Median: 0.0 95th percentile: 1.6 Maximum: 16.2  Events per hour AI: 3.5 HI: 2.4 AHI: 5.9  Apnea Index Central: 2.0 Obstructive: 1.5 Unknown: 0.0  RERA Index 0.4  Cheyne-Hoyos respiration (average duration per night) 0 minutes (0%)

## 2025-08-01 ENCOUNTER — OFFICE VISIT (OUTPATIENT)
Dept: FAMILY MEDICINE CLINIC | Facility: CLINIC | Age: 70
End: 2025-08-01

## 2025-08-01 ENCOUNTER — PATIENT MESSAGE (OUTPATIENT)
Dept: FAMILY MEDICINE CLINIC | Facility: CLINIC | Age: 70
End: 2025-08-01

## 2025-08-01 VITALS
HEART RATE: 80 BPM | SYSTOLIC BLOOD PRESSURE: 118 MMHG | RESPIRATION RATE: 18 BRPM | WEIGHT: 205.5 LBS | HEIGHT: 63 IN | BODY MASS INDEX: 36.41 KG/M2 | OXYGEN SATURATION: 98 % | DIASTOLIC BLOOD PRESSURE: 70 MMHG

## 2025-08-01 DIAGNOSIS — N39.0 ACUTE UTI: Primary | ICD-10-CM

## 2025-08-01 LAB
BILIRUBIN: NEGATIVE
GLUCOSE (URINE DIPSTICK): NEGATIVE MG/DL
KETONES (URINE DIPSTICK): NEGATIVE MG/DL
MULTISTIX LOT#: ABNORMAL NUMERIC
NITRITE, URINE: NEGATIVE
PH, URINE: 6.5 (ref 4.5–8)
PROTEIN (URINE DIPSTICK): NEGATIVE MG/DL
SPECIFIC GRAVITY: 1.01 (ref 1–1.03)
URINE-COLOR: YELLOW
UROBILINOGEN,SEMI-QN: 0.2 MG/DL (ref 0–1.9)

## 2025-08-01 PROCEDURE — 1160F RVW MEDS BY RX/DR IN RCRD: CPT | Performed by: FAMILY MEDICINE

## 2025-08-01 PROCEDURE — 99213 OFFICE O/P EST LOW 20 MIN: CPT | Performed by: FAMILY MEDICINE

## 2025-08-01 PROCEDURE — 87086 URINE CULTURE/COLONY COUNT: CPT | Performed by: FAMILY MEDICINE

## 2025-08-01 PROCEDURE — 1159F MED LIST DOCD IN RCRD: CPT | Performed by: FAMILY MEDICINE

## 2025-08-01 PROCEDURE — 81003 URINALYSIS AUTO W/O SCOPE: CPT | Performed by: FAMILY MEDICINE

## 2025-08-04 ENCOUNTER — OFFICE VISIT (OUTPATIENT)
Dept: FAMILY MEDICINE CLINIC | Facility: CLINIC | Age: 70
End: 2025-08-04

## 2025-08-04 VITALS
OXYGEN SATURATION: 98 % | WEIGHT: 205.13 LBS | SYSTOLIC BLOOD PRESSURE: 118 MMHG | BODY MASS INDEX: 36.35 KG/M2 | HEIGHT: 63 IN | HEART RATE: 59 BPM | DIASTOLIC BLOOD PRESSURE: 64 MMHG | RESPIRATION RATE: 16 BRPM

## 2025-08-04 DIAGNOSIS — E66.812 CLASS 2 OBESITY DUE TO EXCESS CALORIES WITHOUT SERIOUS COMORBIDITY WITH BODY MASS INDEX (BMI) OF 37.0 TO 37.9 IN ADULT: ICD-10-CM

## 2025-08-04 DIAGNOSIS — Z79.899 MEDICATION MANAGEMENT: Primary | ICD-10-CM

## 2025-08-04 DIAGNOSIS — Z71.82 EXERCISE COUNSELING: ICD-10-CM

## 2025-08-04 DIAGNOSIS — E66.09 CLASS 2 OBESITY DUE TO EXCESS CALORIES WITHOUT SERIOUS COMORBIDITY WITH BODY MASS INDEX (BMI) OF 37.0 TO 37.9 IN ADULT: ICD-10-CM

## 2025-08-04 DIAGNOSIS — Z71.3 DIETARY COUNSELING: ICD-10-CM

## 2025-08-04 DIAGNOSIS — N39.0 ACUTE UTI: ICD-10-CM

## 2025-08-04 PROCEDURE — 1160F RVW MEDS BY RX/DR IN RCRD: CPT | Performed by: FAMILY MEDICINE

## 2025-08-04 PROCEDURE — 1159F MED LIST DOCD IN RCRD: CPT | Performed by: FAMILY MEDICINE

## 2025-08-04 PROCEDURE — 99213 OFFICE O/P EST LOW 20 MIN: CPT | Performed by: FAMILY MEDICINE

## 2025-08-07 ENCOUNTER — OFFICE VISIT (OUTPATIENT)
Dept: NEUROLOGY | Facility: CLINIC | Age: 70
End: 2025-08-07

## 2025-08-07 ENCOUNTER — LAB ENCOUNTER (OUTPATIENT)
Dept: LAB | Facility: HOSPITAL | Age: 70
End: 2025-08-07
Attending: Other

## 2025-08-07 VITALS
BODY MASS INDEX: 36.5 KG/M2 | RESPIRATION RATE: 16 BRPM | OXYGEN SATURATION: 95 % | SYSTOLIC BLOOD PRESSURE: 112 MMHG | WEIGHT: 206 LBS | HEART RATE: 75 BPM | DIASTOLIC BLOOD PRESSURE: 80 MMHG | HEIGHT: 63 IN

## 2025-08-07 DIAGNOSIS — R51.9 NONINTRACTABLE HEADACHE, UNSPECIFIED CHRONICITY PATTERN, UNSPECIFIED HEADACHE TYPE: ICD-10-CM

## 2025-08-07 DIAGNOSIS — H53.8 BLURRY VISION, BILATERAL: ICD-10-CM

## 2025-08-07 DIAGNOSIS — R41.3 MEMORY LOSS: ICD-10-CM

## 2025-08-07 DIAGNOSIS — D32.9 MENINGIOMA (HCC): Primary | ICD-10-CM

## 2025-08-07 LAB
CRP SERPL-MCNC: <0.5 MG/DL (ref ?–0.5)
ERYTHROCYTE [SEDIMENTATION RATE] IN BLOOD: 18 MM/HR (ref 0–30)
FOLATE SERPL-MCNC: 23.4 NG/ML (ref 5.4–?)
VIT B12 SERPL-MCNC: 352 PG/ML (ref 211–911)

## 2025-08-07 PROCEDURE — 99204 OFFICE O/P NEW MOD 45 MIN: CPT | Performed by: OTHER

## 2025-08-07 PROCEDURE — G2211 COMPLEX E/M VISIT ADD ON: HCPCS | Performed by: OTHER

## 2025-08-07 PROCEDURE — 36415 COLL VENOUS BLD VENIPUNCTURE: CPT

## 2025-08-07 PROCEDURE — 85652 RBC SED RATE AUTOMATED: CPT

## 2025-08-07 PROCEDURE — 83921 ORGANIC ACID SINGLE QUANT: CPT

## 2025-08-07 PROCEDURE — 1160F RVW MEDS BY RX/DR IN RCRD: CPT | Performed by: OTHER

## 2025-08-07 PROCEDURE — 82607 VITAMIN B-12: CPT

## 2025-08-07 PROCEDURE — 86140 C-REACTIVE PROTEIN: CPT

## 2025-08-07 PROCEDURE — 84425 ASSAY OF VITAMIN B-1: CPT

## 2025-08-07 PROCEDURE — 1159F MED LIST DOCD IN RCRD: CPT | Performed by: OTHER

## 2025-08-07 PROCEDURE — 82746 ASSAY OF FOLIC ACID SERUM: CPT

## 2025-08-14 LAB — VITAMIN B1 WHOLE BLD: 103.1 NMOL/L

## 2025-08-16 LAB — METHYLMALONIC ACID: 253 NMOL/L

## 2025-08-22 DIAGNOSIS — I10 ESSENTIAL HYPERTENSION: ICD-10-CM

## 2025-08-22 RX ORDER — VALSARTAN AND HYDROCHLOROTHIAZIDE 80; 12.5 MG/1; MG/1
1 TABLET, FILM COATED ORAL DAILY
Qty: 90 TABLET | Refills: 0 | Status: SHIPPED | OUTPATIENT
Start: 2025-08-22

## (undated) NOTE — LETTER
AUTHORIZATION FOR SURGICAL OPERATION OR OTHER PROCEDURE    1. I hereby authorize Dr. Jacquie Irene, and [facility] staff assigned to my case to perform the following operation and/or procedure at the [facility], [facility]:    _______________________________________________________________________________________________    Cortisone Injection Right Foot  _______________________________________________________________________________________________    2. My physician has explained the nature and purpose of the operation or other procedure, possible alternative methods of treatment, the risks involved, and the possibility of complication to me. I acknowledge that no guarantee has been made as to the result that may be obtained. 3.  I recognize that, during the course of this operation, or other procedure, unforseen conditions may necessitate additional or different procedure than those listed above. I, therefore, further authorize and request that the above named physician, his/her physician assistants or designees perform such procedures as are, in his/her professional opinion, necessary and desirable. 4.  Any tissue or organs removed in the operation or other procedure may be disposed of by and at the discretion of the [facility], [facility] and [facility]. 5.  I understand that in the event of a medical emergency, I will be transported by local paramedics to [facility] or other hospital emergency department. 6.  I certify that I have read and fully understand the above consent to operation and/or other procedure. 7.  I acknowledge that my physician has explained sedation/analgesia administration to me including the risks and benefits. I consent to the administration of sedation/analgesia as may be necessary or desirable in the judgement of my physician.     Witness signature: ___________________________________________________ Date:  ______/______/_____ Time:  ________ A. M.  P.M. Patient Name:  ______________________________________________________  (please print)      Patient signature:  ___________________________________________________             Relationship to Patient:           []  Parent    Responsible person                          []  Spouse  In case of minor or                    [] Other  _____________   Incompetent name:  __________________________________________________                               (please print)      _____________      Responsible person  In case of minor or  Incompetent signature:  _______________________________________________    Statement of Physician  My signature below affirms that prior to the time of the procedure, I have explained to the patient and/or his/her guardian, the risks and benefits involved in the proposed treatment and any reasonable alternative to the proposed treatment. I have also explained the risks and benefits involved in the refusal of the proposed treatment and have answered the patient's questions.                         Date:  ______/______/_______  Provider                      Signature:  __________________________________________________________       Time:  ___________ A.M    P.M.

## (undated) NOTE — Clinical Note
Ashley - I saw León Felix today with vaginal bulging. She has rectocele, vulvovaginal atrophy, pelvic muscle weakness, and MEGGAN. We'll start with vaginal estrogen cream, pelvic exercises, and bowel mgmt. I'll see her back to monitor her sx.  I appreciate the opportu

## (undated) NOTE — MR AVS SNAPSHOT
[facility], [address] [id number]               Thank you for choosing us for your health care visit with Marivel Aburto DO.   We are glad to serve you and happy to provide you with this summary MULTIVITAMIN OR   Take by mouth. UNISOM OR   Take 0.5 tablets by mouth as needed. Vitamin D 2000 units Caps   Take 1 capsule by mouth daily.                    Health Goals discussed Today        Last Edited       Choose a meal plan

## (undated) NOTE — MR AVS SNAPSHOT
After Visit Summary   6/20/2017    Camilla Mclaughlin    MRN: [id number]           Visit Information        Provider Department Dept Phone    6/20/2017  8:00 PM Bed1  [facility] [phone]      Allergies as of 6/20/2017  Reviewed on: 6/9/2 Tips for increasing your physical activity – Adults who are physically active are less likely to develop some chronic diseases than adults who are inactive.      HOW TO GET STARTED: HOW TO STAY MOTIVATED:   Start activities slowly and build up over time Do